# Patient Record
Sex: FEMALE | Race: BLACK OR AFRICAN AMERICAN | Employment: OTHER | URBAN - METROPOLITAN AREA
[De-identification: names, ages, dates, MRNs, and addresses within clinical notes are randomized per-mention and may not be internally consistent; named-entity substitution may affect disease eponyms.]

---

## 2017-07-27 ENCOUNTER — HOSPITAL ENCOUNTER (OUTPATIENT)
Dept: PREADMISSION TESTING | Age: 77
Discharge: HOME | End: 2017-07-27
Payer: MEDICARE

## 2017-07-27 ENCOUNTER — APPOINTMENT (OUTPATIENT)
Dept: PREADMISSION TESTING | Age: 77
End: 2017-07-27
Payer: MEDICARE

## 2017-07-27 VITALS
SYSTOLIC BLOOD PRESSURE: 129 MMHG | HEART RATE: 52 BPM | WEIGHT: 178 LBS | TEMPERATURE: 98.2 F | BODY MASS INDEX: 32.76 KG/M2 | HEIGHT: 62 IN | DIASTOLIC BLOOD PRESSURE: 83 MMHG

## 2017-07-27 LAB
ABO + RH BLD: NORMAL
ANION GAP BLD CALC-SCNC: 8 MMOL/L (ref 5–15)
APPEARANCE UR: CLEAR
BACTERIA URNS QL MICRO: NEGATIVE /HPF
BILIRUB UR QL: NEGATIVE
BLOOD GROUP ANTIBODIES SERPL: NORMAL
BUN SERPL-MCNC: 16 MG/DL (ref 6–20)
BUN/CREAT SERPL: 22 (ref 12–20)
CALCIUM SERPL-MCNC: 9 MG/DL (ref 8.5–10.1)
CHLORIDE SERPL-SCNC: 101 MMOL/L (ref 97–108)
CO2 SERPL-SCNC: 32 MMOL/L (ref 21–32)
COLOR UR: ABNORMAL
CREAT SERPL-MCNC: 0.73 MG/DL (ref 0.55–1.02)
EPITH CASTS URNS QL MICRO: ABNORMAL /LPF
ERYTHROCYTE [DISTWIDTH] IN BLOOD BY AUTOMATED COUNT: 13.8 % (ref 11.5–14.5)
EST. AVERAGE GLUCOSE BLD GHB EST-MCNC: 140 MG/DL
GLUCOSE SERPL-MCNC: 98 MG/DL (ref 65–100)
GLUCOSE UR STRIP.AUTO-MCNC: NEGATIVE MG/DL
HBA1C MFR BLD: 6.5 % (ref 4.2–6.3)
HCT VFR BLD AUTO: 39.3 % (ref 35–47)
HGB BLD-MCNC: 13 G/DL (ref 11.5–16)
HGB UR QL STRIP: NEGATIVE
INR PPP: 1 (ref 0.9–1.1)
KETONES UR QL STRIP.AUTO: NEGATIVE MG/DL
LEUKOCYTE ESTERASE UR QL STRIP.AUTO: ABNORMAL
MCH RBC QN AUTO: 28.6 PG (ref 26–34)
MCHC RBC AUTO-ENTMCNC: 33.1 G/DL (ref 30–36.5)
MCV RBC AUTO: 86.4 FL (ref 80–99)
NITRITE UR QL STRIP.AUTO: NEGATIVE
PH UR STRIP: 5 [PH] (ref 5–8)
PLATELET # BLD AUTO: 210 K/UL (ref 150–400)
POTASSIUM SERPL-SCNC: 3.9 MMOL/L (ref 3.5–5.1)
PROT UR STRIP-MCNC: NEGATIVE MG/DL
PROTHROMBIN TIME: 10.2 SEC (ref 9–11.1)
RBC # BLD AUTO: 4.55 M/UL (ref 3.8–5.2)
RBC #/AREA URNS HPF: ABNORMAL /HPF (ref 0–5)
SODIUM SERPL-SCNC: 141 MMOL/L (ref 136–145)
SP GR UR REFRACTOMETRY: 1.02 (ref 1–1.03)
SPECIMEN EXP DATE BLD: NORMAL
UA: UC IF INDICATED,UAUC: ABNORMAL
UROBILINOGEN UR QL STRIP.AUTO: 0.2 EU/DL (ref 0.2–1)
WBC # BLD AUTO: 4.2 K/UL (ref 3.6–11)
WBC URNS QL MICRO: ABNORMAL /HPF (ref 0–4)

## 2017-07-27 PROCEDURE — 85027 COMPLETE CBC AUTOMATED: CPT | Performed by: ORTHOPAEDIC SURGERY

## 2017-07-27 PROCEDURE — 36415 COLL VENOUS BLD VENIPUNCTURE: CPT | Performed by: ORTHOPAEDIC SURGERY

## 2017-07-27 PROCEDURE — 80048 BASIC METABOLIC PNL TOTAL CA: CPT | Performed by: ORTHOPAEDIC SURGERY

## 2017-07-27 PROCEDURE — 83036 HEMOGLOBIN GLYCOSYLATED A1C: CPT | Performed by: ORTHOPAEDIC SURGERY

## 2017-07-27 PROCEDURE — 86900 BLOOD TYPING SEROLOGIC ABO: CPT | Performed by: ORTHOPAEDIC SURGERY

## 2017-07-27 PROCEDURE — 81001 URINALYSIS AUTO W/SCOPE: CPT | Performed by: ORTHOPAEDIC SURGERY

## 2017-07-27 PROCEDURE — 85610 PROTHROMBIN TIME: CPT | Performed by: ORTHOPAEDIC SURGERY

## 2017-07-27 RX ORDER — ROSUVASTATIN CALCIUM 10 MG/1
10 TABLET, COATED ORAL
COMMUNITY

## 2017-07-27 RX ORDER — BISOPROLOL FUMARATE AND HYDROCHLOROTHIAZIDE 10; 6.25 MG/1; MG/1
1 TABLET ORAL 2 TIMES DAILY
COMMUNITY

## 2017-07-27 RX ORDER — CYANOCOBALAMIN (VITAMIN B-12) 500 MCG
800 TABLET ORAL DAILY
COMMUNITY

## 2017-07-27 RX ORDER — LORATADINE 10 MG/1
10 TABLET ORAL
COMMUNITY

## 2017-07-27 RX ORDER — TRAMADOL HYDROCHLORIDE 50 MG/1
100 TABLET ORAL
COMMUNITY
End: 2017-08-05

## 2017-07-27 RX ORDER — SIMETHICONE 80 MG
80 TABLET,CHEWABLE ORAL
COMMUNITY

## 2017-07-27 RX ORDER — OMEPRAZOLE 20 MG/1
20 CAPSULE, DELAYED RELEASE ORAL DAILY
COMMUNITY

## 2017-07-27 RX ORDER — BISMUTH SUBSALICYLATE 262 MG
1 TABLET,CHEWABLE ORAL DAILY
COMMUNITY

## 2017-07-27 NOTE — PERIOP NOTES
PREOPERATIVE INSTRUCTIONS REVIEWED WITH PATIENT. PATIENT GIVEN TWO--SIX PACKS OF CHG WIPES. INSTRUCTIONS REVIEWED ON USE OF CHG WIPES. PATIENT GIVEN SSI INFECTIONS SHEET; MRSA/MSSA TREATMENT INSTRUCTION SHEET GIVEN WITH AN EXPLANATION TO PATIENT THAT THEY WILL BE NOTIFIED IF TREATMENT INSTRUCTIONS NEED TO BE INITIATED. PATIENT WAS GIVEN THE OPPORTUNITY TO ASK QUESTIONS; REGARDING THE INFORMATION PROVIDED. PREOP DIET AND NUTRITION UPDATED GUIDELINES/ INSTRUCTIONS REVIEWED WITH PATIENT. PATIENT GIVEN INSTRUCTIONS AND DEMONSTRATED UNDERSTANDING OF INCENTIVE SPIROMETRY USE. PATIENT ATTENDED PREOP JOINT CLASS TODAY.

## 2017-07-28 LAB
BACTERIA SPEC CULT: NORMAL
BACTERIA SPEC CULT: NORMAL
SERVICE CMNT-IMP: NORMAL

## 2017-07-28 NOTE — PERIOP NOTES
SPOKE WITH VIANNEY--DR Lewis Messer; FAXED CARDIO: DR MAJANO NOTES DATED 5/8/17. PATIENT STATED THAT SHE HAD PREOP APPT. WITH CARDIO: DR Vikki Agosto ON 7/24/17 BUT THOSE NOTES WERE NOT SENT TO [place] [facility] [place] OFFICE NOTES/EKG FROM MAY 8, 2017 WERE RECEIVED. VIANNEY WILL F/U WITH DR MAJANO ON Monday.

## 2017-08-01 ENCOUNTER — ANESTHESIA EVENT (OUTPATIENT)
Dept: SURGERY | Age: 77
DRG: 470 | End: 2017-08-01
Payer: MEDICARE

## 2017-08-01 PROBLEM — M16.12 PRIMARY OSTEOARTHRITIS OF LEFT HIP: Status: ACTIVE | Noted: 2017-08-01

## 2017-08-01 NOTE — H&P
Melissa Rea comes in complaining of pain in the knees, hips, and lower back. She has previously received epidural injections. Her last one was in January and she experienced relief until the past month when symptoms flared up. She complains of most pain in her right knee and hips, mainly in the left hip. Objective:   Constitutional:  No acute distress. Well nourished. Well developed. Eyes:  Sclera are nonicteric. Respiratory:  No labored breathing. Cardiovascular:  No marked edema. Skin:  No marked skin ulcers. Neurological:  No marked sensory loss noted. Psychiatric: Alert and oriented x3. Musculoskeletal      She has severe pain to rotation of the left hip. Moderate pain to rotation of the right hip. She ambulates with a limp and has pain in the left hip with ambulation that radiates throughout her leg. The cruciate and collateral ligaments of both knees are stable. No sign of infection. No effusion. No cellulitis or rash. No evidence of calf pain, no sign of DVT. The extensor mechanism is intact. The neurovascular status is intact.         Radiographs:       X-ray Hip Left 2+ Views ([id number])     Result Date: 7/11/2017  AP Pelvis, Frog Lateral. Notes Indication(s): Pain of left hip. Impression: I ordered and interpreted X-rays of the left hip and pelvis. The X-rays reveal advanced degenerative arthritis of the left hip and moderate to severe arthritis of the right hip. No fractures, no sign of metastatic disease.      X-ray Knee Left 3 Views ([id number])     Result Date: 7/11/2017  Views: Standing AP, Lat, Ferris. Notes Indication(s): Pain of left knee. Impression: I ordered and interpreted X-rays of the left knee. The X-rays reveal mild degenerative changes. No fractures.      X-ray Knee Right 3 Views ([id number])     Result Date: 7/11/2017  Views: Standing AP, Lat, Ferris. Notes Indication(s): Pain of right knee. Impression: I ordered and interpreted X-rays of the right knee.  The X-rays reveal advanced degenerative arthritis of the right knee, most notable in the patellofemoral and lateral compartments. No fractures, no sign of metastatic disease.          Assessment:      1. Primary osteoarthritis of hips, bilateral    2. Primary osteoarthritis of knees, bilateral    3. Left hip pain    4. Chronic pain of both knees            Plan:   I explained that I believe the majority of her knee pain is a result of referred pain from the hip. I explained that she has very severe arthritis of the left hip with bone-on-bone contact. She is not having as much trouble with the right hip, but I discussed that she does have degenerative changes in the right hip. I explained that she also has some severe degenerative changes in the right knee.     We discussed treatment options. I explained that I believe she needs a left hip replacement. I described the procedure of total hip replacement. I explained the hospitalization, post-op and rehabilitation for the procedure. The patient is aware of all complications associated with the surgery, including the chance of infection and blood clots. She is aware of the slight chance of the hip coming out of the socket. He understands that he would be on anti-coagulants following surgery to prevent DVT and that he would undergo a course of post-op physical therapy for rehabilitation. PROCEDURE: Left total hip replacement. Date of Surgery Update:  Melissa Rea was seen and examined.   Past Medical History:   Diagnosis Date    Arthritis     Asthma     MILD, LAST ATTACK 4 YRS AGO, NO INHALER USED    Cancer (HCC)     BREAST, LEFT    Chronic pain     LEFT HIP, RIGHT KNEE, SHOULDERS, NECK    Diverticulosis     GERD (gastroesophageal reflux disease)     H/O seasonal allergies     Hyperlipemia     Hypertension     Liver disease     FATTY LIVER, NON-ALCOHOLIC    PUD (peptic ulcer disease)     Sleep apnea     NO CPAP USED NOW    Thyroid goiter 2015    RADIATION TREATMENT     Prior to Admission Medications   Prescriptions Last Dose Informant Patient Reported? Taking? FSH/FLX/PRIM/CUR/BOR/OM3,6,9 5 (OMEGA 3-6-9 FATTY ACIDS PO) 7/27/2017  Yes No   Sig: Take  by mouth daily. bisoprolol-hydroCHLOROthiazide [facility]) 10-6.25 mg per tablet 8/2/2017 at 0630  Yes Yes   Sig: Take 1 Tab by mouth two (2) times a day. cholecalciferol (VITAMIN D3) 400 unit tab tablet 7/27/2017  Yes No   Sig: Take 800 Units by mouth daily. loratadine (CLARITIN) 10 mg tablet 8/1/2017 at Unknown time  Yes Yes   Sig: Take 10 mg by mouth daily as needed for Allergies. multivitamin (ONE A DAY) tablet 7/27/2017  Yes No   Sig: Take 1 Tab by mouth daily. omeprazole (PRILOSEC) 20 mg capsule 8/2/2017 at 0630  Yes Yes   Sig: Take 20 mg by mouth daily. rosuvastatin (CRESTOR) 10 mg tablet 8/1/2017 at Unknown time  Yes Yes   Sig: Take 10 mg by mouth nightly. simethicone (MYLICON) 80 mg chewable tablet 8/1/2017 at Unknown time  Yes Yes   Sig: Take 80 mg by mouth two (2) times daily as needed for Flatulence. traMADol (ULTRAM) 50 mg tablet 8/2/2017 at 0630  Yes Yes   Sig: Take 100 mg by mouth every six (6) hours as needed for Pain. Facility-Administered Medications: None      Allergy to:Lipitor [atorvastatin]  Physical Examination: General appearance - alert, well appearing, and in no distress  History and physical has been reviewed. The patient has been examined.  There have been no significant clinical changes since the completion of the originally dated History and Physical.    Signed By: Jules Chowdhury PA-C     August 2, 2017 7:53 AM

## 2017-08-02 ENCOUNTER — APPOINTMENT (OUTPATIENT)
Dept: GENERAL RADIOLOGY | Age: 77
DRG: 470 | End: 2017-08-02
Attending: ORTHOPAEDIC SURGERY
Payer: MEDICARE

## 2017-08-02 ENCOUNTER — HOSPITAL ENCOUNTER (INPATIENT)
Age: 77
LOS: 3 days | Discharge: NURSING, REHAB OR LONG-TERM CARE | DRG: 470 | End: 2017-08-05
Attending: ORTHOPAEDIC SURGERY | Admitting: ORTHOPAEDIC SURGERY
Payer: MEDICARE

## 2017-08-02 ENCOUNTER — APPOINTMENT (OUTPATIENT)
Dept: GENERAL RADIOLOGY | Age: 77
DRG: 470 | End: 2017-08-02
Payer: MEDICARE

## 2017-08-02 ENCOUNTER — ANESTHESIA (OUTPATIENT)
Dept: SURGERY | Age: 77
DRG: 470 | End: 2017-08-02
Payer: MEDICARE

## 2017-08-02 PROBLEM — E66.9 OBESITY (BMI 30.0-34.9): Chronic | Status: ACTIVE | Noted: 2017-07-26

## 2017-08-02 LAB
GLUCOSE BLD STRIP.AUTO-MCNC: 115 MG/DL (ref 65–100)
GLUCOSE BLD STRIP.AUTO-MCNC: 149 MG/DL (ref 65–100)
SERVICE CMNT-IMP: ABNORMAL
SERVICE CMNT-IMP: ABNORMAL

## 2017-08-02 PROCEDURE — 77030034850: Performed by: ORTHOPAEDIC SURGERY

## 2017-08-02 PROCEDURE — 74011000258 HC RX REV CODE- 258

## 2017-08-02 PROCEDURE — 73501 X-RAY EXAM HIP UNI 1 VIEW: CPT

## 2017-08-02 PROCEDURE — 77030020782 HC GWN BAIR PAWS FLX 3M -B

## 2017-08-02 PROCEDURE — 77030008467 HC STPLR SKN COVD -B: Performed by: ORTHOPAEDIC SURGERY

## 2017-08-02 PROCEDURE — 77030011264 HC ELECTRD BLD EXT COVD -A: Performed by: ORTHOPAEDIC SURGERY

## 2017-08-02 PROCEDURE — 3E0T3CZ INTRODUCE REGIONAL ANESTH IN PERIPH NRV, PLEXI, PERC: ICD-10-PCS | Performed by: ANESTHESIOLOGY

## 2017-08-02 PROCEDURE — 77030035236 HC SUT PDS STRATFX BARB J&J -B: Performed by: ORTHOPAEDIC SURGERY

## 2017-08-02 PROCEDURE — 77030020788: Performed by: ORTHOPAEDIC SURGERY

## 2017-08-02 PROCEDURE — 77030011640 HC PAD GRND REM COVD -A: Performed by: ORTHOPAEDIC SURGERY

## 2017-08-02 PROCEDURE — 77030003601 HC NDL NRV BLK BBMI -A

## 2017-08-02 PROCEDURE — 77030012935 HC DRSG AQUACEL BMS -B: Performed by: ORTHOPAEDIC SURGERY

## 2017-08-02 PROCEDURE — 77030006822 HC BLD SAW SAG BRSM -B: Performed by: ORTHOPAEDIC SURGERY

## 2017-08-02 PROCEDURE — C1776 JOINT DEVICE (IMPLANTABLE): HCPCS | Performed by: ORTHOPAEDIC SURGERY

## 2017-08-02 PROCEDURE — 74011000250 HC RX REV CODE- 250

## 2017-08-02 PROCEDURE — 77030013079 HC BLNKT BAIR HGGR 3M -A: Performed by: ANESTHESIOLOGY

## 2017-08-02 PROCEDURE — 65270000029 HC RM PRIVATE

## 2017-08-02 PROCEDURE — 76010000172 HC OR TIME 2.5 TO 3 HR INTENSV-TIER 1: Performed by: ORTHOPAEDIC SURGERY

## 2017-08-02 PROCEDURE — 76060000036 HC ANESTHESIA 2.5 TO 3 HR: Performed by: ORTHOPAEDIC SURGERY

## 2017-08-02 PROCEDURE — 74011250636 HC RX REV CODE- 250/636: Performed by: PHYSICIAN ASSISTANT

## 2017-08-02 PROCEDURE — 77030007866 HC KT SPN ANES BBMI -B: Performed by: ANESTHESIOLOGY

## 2017-08-02 PROCEDURE — 77030018846 HC SOL IRR STRL H20 ICUM -A: Performed by: ORTHOPAEDIC SURGERY

## 2017-08-02 PROCEDURE — 74011250636 HC RX REV CODE- 250/636: Performed by: ANESTHESIOLOGY

## 2017-08-02 PROCEDURE — 76210000006 HC OR PH I REC 0.5 TO 1 HR: Performed by: ORTHOPAEDIC SURGERY

## 2017-08-02 PROCEDURE — 97530 THERAPEUTIC ACTIVITIES: CPT

## 2017-08-02 PROCEDURE — 77030032490 HC SLV COMPR SCD KNE COVD -B

## 2017-08-02 PROCEDURE — 77030018836 HC SOL IRR NACL ICUM -A: Performed by: ORTHOPAEDIC SURGERY

## 2017-08-02 PROCEDURE — 77030012893

## 2017-08-02 PROCEDURE — 74011250636 HC RX REV CODE- 250/636

## 2017-08-02 PROCEDURE — 74011000250 HC RX REV CODE- 250: Performed by: PHYSICIAN ASSISTANT

## 2017-08-02 PROCEDURE — 74011250637 HC RX REV CODE- 250/637: Performed by: PHYSICIAN ASSISTANT

## 2017-08-02 PROCEDURE — 82962 GLUCOSE BLOOD TEST: CPT

## 2017-08-02 PROCEDURE — 74011250637 HC RX REV CODE- 250/637: Performed by: NURSE PRACTITIONER

## 2017-08-02 PROCEDURE — 74011000250 HC RX REV CODE- 250: Performed by: ANESTHESIOLOGY

## 2017-08-02 PROCEDURE — 0SRB04A REPLACEMENT OF LEFT HIP JOINT WITH CERAMIC ON POLYETHYLENE SYNTHETIC SUBSTITUTE, UNCEMENTED, OPEN APPROACH: ICD-10-PCS | Performed by: ORTHOPAEDIC SURGERY

## 2017-08-02 PROCEDURE — 77030031139 HC SUT VCRL2 J&J -A: Performed by: ORTHOPAEDIC SURGERY

## 2017-08-02 PROCEDURE — 97161 PT EVAL LOW COMPLEX 20 MIN: CPT

## 2017-08-02 DEVICE — COMPONENT TOT HIP PRIMARY CERM ALTRX: Type: IMPLANTABLE DEVICE | Site: HIP | Status: FUNCTIONAL

## 2017-08-02 DEVICE — STEM FEM SZ 4 L140MM 130DEG STD OFFSET CALCAR HIP BILAT TI: Type: IMPLANTABLE DEVICE | Site: HIP | Status: FUNCTIONAL

## 2017-08-02 DEVICE — SCR ACET CANC PINN 6.5X15MM SS --: Type: IMPLANTABLE DEVICE | Site: HIP | Status: FUNCTIONAL

## 2017-08-02 DEVICE — CUP ACET DIA52MM HIP GRIPTION PRI CEMENTLESS FIX SECT SER: Type: IMPLANTABLE DEVICE | Site: HIP | Status: FUNCTIONAL

## 2017-08-02 DEVICE — LINER ACET OD52MM ID36MM HIP ALTRX PINN: Type: IMPLANTABLE DEVICE | Site: HIP | Status: FUNCTIONAL

## 2017-08-02 DEVICE — HEAD FEM DIA36MM +5MM OFFSET 12/14 TAPR HIP CERAMIC BIOLOX: Type: IMPLANTABLE DEVICE | Site: HIP | Status: FUNCTIONAL

## 2017-08-02 RX ORDER — DEXAMETHASONE SODIUM PHOSPHATE 10 MG/ML
10 INJECTION INTRAMUSCULAR; INTRAVENOUS ONCE
Status: COMPLETED | OUTPATIENT
Start: 2017-08-03 | End: 2017-08-03

## 2017-08-02 RX ORDER — SODIUM CHLORIDE, SODIUM LACTATE, POTASSIUM CHLORIDE, CALCIUM CHLORIDE 600; 310; 30; 20 MG/100ML; MG/100ML; MG/100ML; MG/100ML
125 INJECTION, SOLUTION INTRAVENOUS CONTINUOUS
Status: DISCONTINUED | OUTPATIENT
Start: 2017-08-02 | End: 2017-08-02 | Stop reason: HOSPADM

## 2017-08-02 RX ORDER — MIDAZOLAM HYDROCHLORIDE 1 MG/ML
INJECTION, SOLUTION INTRAMUSCULAR; INTRAVENOUS AS NEEDED
Status: DISCONTINUED | OUTPATIENT
Start: 2017-08-02 | End: 2017-08-02 | Stop reason: HOSPADM

## 2017-08-02 RX ORDER — CEFAZOLIN SODIUM IN 0.9 % NACL 2 G/50 ML
2 INTRAVENOUS SOLUTION, PIGGYBACK (ML) INTRAVENOUS EVERY 8 HOURS
Status: COMPLETED | OUTPATIENT
Start: 2017-08-02 | End: 2017-08-03

## 2017-08-02 RX ORDER — ROSUVASTATIN CALCIUM 10 MG/1
10 TABLET, COATED ORAL
Status: DISCONTINUED | OUTPATIENT
Start: 2017-08-03 | End: 2017-08-05 | Stop reason: HOSPADM

## 2017-08-02 RX ORDER — CELECOXIB 200 MG/1
200 CAPSULE ORAL 2 TIMES DAILY
Status: DISCONTINUED | OUTPATIENT
Start: 2017-08-02 | End: 2017-08-05 | Stop reason: HOSPADM

## 2017-08-02 RX ORDER — PROPOFOL 10 MG/ML
INJECTION, EMULSION INTRAVENOUS AS NEEDED
Status: DISCONTINUED | OUTPATIENT
Start: 2017-08-02 | End: 2017-08-02 | Stop reason: HOSPADM

## 2017-08-02 RX ORDER — SODIUM CHLORIDE 0.9 % (FLUSH) 0.9 %
5-10 SYRINGE (ML) INJECTION AS NEEDED
Status: DISCONTINUED | OUTPATIENT
Start: 2017-08-02 | End: 2017-08-02 | Stop reason: HOSPADM

## 2017-08-02 RX ORDER — NALOXONE HYDROCHLORIDE 0.4 MG/ML
0.4 INJECTION, SOLUTION INTRAMUSCULAR; INTRAVENOUS; SUBCUTANEOUS AS NEEDED
Status: DISCONTINUED | OUTPATIENT
Start: 2017-08-02 | End: 2017-08-05 | Stop reason: HOSPADM

## 2017-08-02 RX ORDER — MIDAZOLAM HYDROCHLORIDE 1 MG/ML
1 INJECTION, SOLUTION INTRAMUSCULAR; INTRAVENOUS AS NEEDED
Status: DISCONTINUED | OUTPATIENT
Start: 2017-08-02 | End: 2017-08-02 | Stop reason: HOSPADM

## 2017-08-02 RX ORDER — BISOPROLOL FUMARATE AND HYDROCHLOROTHIAZIDE 10; 6.25 MG/1; MG/1
1 TABLET ORAL 2 TIMES DAILY
Status: DISCONTINUED | OUTPATIENT
Start: 2017-08-03 | End: 2017-08-05 | Stop reason: HOSPADM

## 2017-08-02 RX ORDER — ACETAMINOPHEN 325 MG/1
650 TABLET ORAL EVERY 6 HOURS
Status: DISCONTINUED | OUTPATIENT
Start: 2017-08-02 | End: 2017-08-05 | Stop reason: HOSPADM

## 2017-08-02 RX ORDER — DEXAMETHASONE SODIUM PHOSPHATE 4 MG/ML
INJECTION, SOLUTION INTRA-ARTICULAR; INTRALESIONAL; INTRAMUSCULAR; INTRAVENOUS; SOFT TISSUE AS NEEDED
Status: DISCONTINUED | OUTPATIENT
Start: 2017-08-02 | End: 2017-08-02 | Stop reason: HOSPADM

## 2017-08-02 RX ORDER — ROSUVASTATIN CALCIUM 10 MG/1
10 TABLET, COATED ORAL
Status: DISCONTINUED | OUTPATIENT
Start: 2017-08-02 | End: 2017-08-02

## 2017-08-02 RX ORDER — SODIUM CHLORIDE 9 MG/ML
125 INJECTION, SOLUTION INTRAVENOUS CONTINUOUS
Status: DISCONTINUED | OUTPATIENT
Start: 2017-08-02 | End: 2017-08-03

## 2017-08-02 RX ORDER — MIDAZOLAM HYDROCHLORIDE 1 MG/ML
1 INJECTION, SOLUTION INTRAMUSCULAR; INTRAVENOUS
Status: DISCONTINUED | OUTPATIENT
Start: 2017-08-02 | End: 2017-08-02 | Stop reason: HOSPADM

## 2017-08-02 RX ORDER — HYDROMORPHONE HYDROCHLORIDE 1 MG/ML
0.5 INJECTION, SOLUTION INTRAMUSCULAR; INTRAVENOUS; SUBCUTANEOUS
Status: ACTIVE | OUTPATIENT
Start: 2017-08-02 | End: 2017-08-03

## 2017-08-02 RX ORDER — POLYVINYL ALCOHOL 14 MG/ML
1 SOLUTION/ DROPS OPHTHALMIC AS NEEDED
Status: DISCONTINUED | OUTPATIENT
Start: 2017-08-02 | End: 2017-08-05 | Stop reason: HOSPADM

## 2017-08-02 RX ORDER — PANTOPRAZOLE SODIUM 40 MG/1
40 TABLET, DELAYED RELEASE ORAL
Status: DISCONTINUED | OUTPATIENT
Start: 2017-08-03 | End: 2017-08-05 | Stop reason: HOSPADM

## 2017-08-02 RX ORDER — ONDANSETRON 2 MG/ML
INJECTION INTRAMUSCULAR; INTRAVENOUS AS NEEDED
Status: DISCONTINUED | OUTPATIENT
Start: 2017-08-02 | End: 2017-08-02 | Stop reason: HOSPADM

## 2017-08-02 RX ORDER — ONDANSETRON 2 MG/ML
4 INJECTION INTRAMUSCULAR; INTRAVENOUS AS NEEDED
Status: DISCONTINUED | OUTPATIENT
Start: 2017-08-02 | End: 2017-08-02 | Stop reason: HOSPADM

## 2017-08-02 RX ORDER — OXYCODONE HYDROCHLORIDE 5 MG/1
5 TABLET ORAL AS NEEDED
Status: DISCONTINUED | OUTPATIENT
Start: 2017-08-02 | End: 2017-08-02 | Stop reason: HOSPADM

## 2017-08-02 RX ORDER — LORATADINE 10 MG/1
10 TABLET ORAL
Status: DISCONTINUED | OUTPATIENT
Start: 2017-08-03 | End: 2017-08-05 | Stop reason: HOSPADM

## 2017-08-02 RX ORDER — FAMOTIDINE 20 MG/1
20 TABLET, FILM COATED ORAL
Status: DISCONTINUED | OUTPATIENT
Start: 2017-08-02 | End: 2017-08-05 | Stop reason: HOSPADM

## 2017-08-02 RX ORDER — SODIUM CHLORIDE 0.9 % (FLUSH) 0.9 %
5-10 SYRINGE (ML) INJECTION AS NEEDED
Status: DISCONTINUED | OUTPATIENT
Start: 2017-08-02 | End: 2017-08-05 | Stop reason: HOSPADM

## 2017-08-02 RX ORDER — AMOXICILLIN 250 MG
1 CAPSULE ORAL 2 TIMES DAILY
Status: DISCONTINUED | OUTPATIENT
Start: 2017-08-02 | End: 2017-08-05 | Stop reason: HOSPADM

## 2017-08-02 RX ORDER — FENTANYL CITRATE 50 UG/ML
50 INJECTION, SOLUTION INTRAMUSCULAR; INTRAVENOUS AS NEEDED
Status: DISCONTINUED | OUTPATIENT
Start: 2017-08-02 | End: 2017-08-02 | Stop reason: HOSPADM

## 2017-08-02 RX ORDER — OXYCODONE HYDROCHLORIDE 5 MG/1
5-7.5 TABLET ORAL
Status: DISCONTINUED | OUTPATIENT
Start: 2017-08-02 | End: 2017-08-04

## 2017-08-02 RX ORDER — PHENYLEPHRINE HCL IN 0.9% NACL 0.4MG/10ML
SYRINGE (ML) INTRAVENOUS
Status: DISCONTINUED | OUTPATIENT
Start: 2017-08-02 | End: 2017-08-02 | Stop reason: HOSPADM

## 2017-08-02 RX ORDER — OXYCODONE HYDROCHLORIDE 5 MG/1
10 TABLET ORAL
Status: DISCONTINUED | OUTPATIENT
Start: 2017-08-02 | End: 2017-08-02

## 2017-08-02 RX ORDER — ONDANSETRON 2 MG/ML
4 INJECTION INTRAMUSCULAR; INTRAVENOUS
Status: ACTIVE | OUTPATIENT
Start: 2017-08-02 | End: 2017-08-03

## 2017-08-02 RX ORDER — SIMETHICONE 80 MG
80 TABLET,CHEWABLE ORAL
Status: DISCONTINUED | OUTPATIENT
Start: 2017-08-02 | End: 2017-08-05 | Stop reason: HOSPADM

## 2017-08-02 RX ORDER — FACIAL-BODY WIPES
10 EACH TOPICAL DAILY PRN
Status: DISCONTINUED | OUTPATIENT
Start: 2017-08-04 | End: 2017-08-05 | Stop reason: HOSPADM

## 2017-08-02 RX ORDER — ACETAMINOPHEN 325 MG/1
650 TABLET ORAL
Status: DISCONTINUED | OUTPATIENT
Start: 2017-08-02 | End: 2017-08-05 | Stop reason: HOSPADM

## 2017-08-02 RX ORDER — BUPIVACAINE HYDROCHLORIDE 5 MG/ML
INJECTION, SOLUTION EPIDURAL; INTRACAUDAL AS NEEDED
Status: DISCONTINUED | OUTPATIENT
Start: 2017-08-02 | End: 2017-08-02 | Stop reason: HOSPADM

## 2017-08-02 RX ORDER — OXYCODONE HYDROCHLORIDE 5 MG/1
2.5 TABLET ORAL
Status: DISCONTINUED | OUTPATIENT
Start: 2017-08-02 | End: 2017-08-05 | Stop reason: HOSPADM

## 2017-08-02 RX ORDER — LIDOCAINE HYDROCHLORIDE 10 MG/ML
0.5 INJECTION, SOLUTION EPIDURAL; INFILTRATION; INTRACAUDAL; PERINEURAL AS NEEDED
Status: DISCONTINUED | OUTPATIENT
Start: 2017-08-02 | End: 2017-08-02 | Stop reason: HOSPADM

## 2017-08-02 RX ORDER — HYDROXYZINE HYDROCHLORIDE 10 MG/1
10 TABLET, FILM COATED ORAL
Status: DISCONTINUED | OUTPATIENT
Start: 2017-08-02 | End: 2017-08-05 | Stop reason: HOSPADM

## 2017-08-02 RX ORDER — DIPHENHYDRAMINE HYDROCHLORIDE 50 MG/ML
12.5 INJECTION, SOLUTION INTRAMUSCULAR; INTRAVENOUS AS NEEDED
Status: DISCONTINUED | OUTPATIENT
Start: 2017-08-02 | End: 2017-08-02 | Stop reason: HOSPADM

## 2017-08-02 RX ORDER — GLYCOPYRROLATE 0.2 MG/ML
INJECTION INTRAMUSCULAR; INTRAVENOUS AS NEEDED
Status: DISCONTINUED | OUTPATIENT
Start: 2017-08-02 | End: 2017-08-02 | Stop reason: HOSPADM

## 2017-08-02 RX ORDER — CEFAZOLIN SODIUM IN 0.9 % NACL 2 G/50 ML
2 INTRAVENOUS SOLUTION, PIGGYBACK (ML) INTRAVENOUS EVERY 8 HOURS
Status: DISCONTINUED | OUTPATIENT
Start: 2017-08-02 | End: 2017-08-02 | Stop reason: HOSPADM

## 2017-08-02 RX ORDER — MORPHINE SULFATE 10 MG/ML
2 INJECTION, SOLUTION INTRAMUSCULAR; INTRAVENOUS
Status: DISCONTINUED | OUTPATIENT
Start: 2017-08-02 | End: 2017-08-02 | Stop reason: HOSPADM

## 2017-08-02 RX ORDER — SODIUM CHLORIDE 0.9 % (FLUSH) 0.9 %
5-10 SYRINGE (ML) INJECTION EVERY 8 HOURS
Status: DISCONTINUED | OUTPATIENT
Start: 2017-08-03 | End: 2017-08-05 | Stop reason: HOSPADM

## 2017-08-02 RX ORDER — FENTANYL CITRATE 50 UG/ML
25 INJECTION, SOLUTION INTRAMUSCULAR; INTRAVENOUS
Status: DISCONTINUED | OUTPATIENT
Start: 2017-08-02 | End: 2017-08-02 | Stop reason: HOSPADM

## 2017-08-02 RX ORDER — HYDROMORPHONE HYDROCHLORIDE 1 MG/ML
0.2 INJECTION, SOLUTION INTRAMUSCULAR; INTRAVENOUS; SUBCUTANEOUS
Status: DISCONTINUED | OUTPATIENT
Start: 2017-08-02 | End: 2017-08-02 | Stop reason: HOSPADM

## 2017-08-02 RX ORDER — DEXTROSE, SODIUM CHLORIDE, SODIUM LACTATE, POTASSIUM CHLORIDE, AND CALCIUM CHLORIDE 5; .6; .31; .03; .02 G/100ML; G/100ML; G/100ML; G/100ML; G/100ML
125 INJECTION, SOLUTION INTRAVENOUS CONTINUOUS
Status: DISCONTINUED | OUTPATIENT
Start: 2017-08-02 | End: 2017-08-02 | Stop reason: HOSPADM

## 2017-08-02 RX ORDER — PROPOFOL 10 MG/ML
INJECTION, EMULSION INTRAVENOUS
Status: DISCONTINUED | OUTPATIENT
Start: 2017-08-02 | End: 2017-08-02 | Stop reason: HOSPADM

## 2017-08-02 RX ORDER — CELECOXIB 200 MG/1
200 CAPSULE ORAL ONCE
Status: COMPLETED | OUTPATIENT
Start: 2017-08-02 | End: 2017-08-02

## 2017-08-02 RX ORDER — SODIUM CHLORIDE 0.9 % (FLUSH) 0.9 %
5-10 SYRINGE (ML) INJECTION EVERY 8 HOURS
Status: DISCONTINUED | OUTPATIENT
Start: 2017-08-02 | End: 2017-08-02 | Stop reason: HOSPADM

## 2017-08-02 RX ORDER — CEFAZOLIN SODIUM IN 0.9 % NACL 2 G/100 ML
PLASTIC BAG, INJECTION (ML) INTRAVENOUS AS NEEDED
Status: DISCONTINUED | OUTPATIENT
Start: 2017-08-02 | End: 2017-08-02 | Stop reason: HOSPADM

## 2017-08-02 RX ORDER — OXYCODONE HYDROCHLORIDE 5 MG/1
5 TABLET ORAL
Status: DISCONTINUED | OUTPATIENT
Start: 2017-08-02 | End: 2017-08-02

## 2017-08-02 RX ORDER — POLYETHYLENE GLYCOL 3350 17 G/17G
17 POWDER, FOR SOLUTION ORAL DAILY
Status: DISCONTINUED | OUTPATIENT
Start: 2017-08-03 | End: 2017-08-05 | Stop reason: HOSPADM

## 2017-08-02 RX ORDER — DEXAMETHASONE SODIUM PHOSPHATE 10 MG/ML
10 INJECTION INTRAMUSCULAR; INTRAVENOUS ONCE
Status: DISCONTINUED | OUTPATIENT
Start: 2017-08-02 | End: 2017-08-02 | Stop reason: HOSPADM

## 2017-08-02 RX ADMIN — PROPOFOL 20 MG: 10 INJECTION, EMULSION INTRAVENOUS at 09:34

## 2017-08-02 RX ADMIN — RIVAROXABAN 10 MG: 10 TABLET, FILM COATED ORAL at 18:19

## 2017-08-02 RX ADMIN — SODIUM CHLORIDE, SODIUM LACTATE, POTASSIUM CHLORIDE, AND CALCIUM CHLORIDE: 600; 310; 30; 20 INJECTION, SOLUTION INTRAVENOUS at 10:37

## 2017-08-02 RX ADMIN — PROPOFOL 30 MG: 10 INJECTION, EMULSION INTRAVENOUS at 09:28

## 2017-08-02 RX ADMIN — MIDAZOLAM HYDROCHLORIDE 2 MG: 1 INJECTION, SOLUTION INTRAMUSCULAR; INTRAVENOUS at 09:20

## 2017-08-02 RX ADMIN — DEXAMETHASONE SODIUM PHOSPHATE 10 MG: 4 INJECTION, SOLUTION INTRA-ARTICULAR; INTRALESIONAL; INTRAMUSCULAR; INTRAVENOUS; SOFT TISSUE at 09:42

## 2017-08-02 RX ADMIN — GLYCOPYRROLATE 0.2 MG: 0.2 INJECTION INTRAMUSCULAR; INTRAVENOUS at 09:54

## 2017-08-02 RX ADMIN — SODIUM CHLORIDE 125 ML/HR: 900 INJECTION, SOLUTION INTRAVENOUS at 12:56

## 2017-08-02 RX ADMIN — PROPOFOL 75 MCG/KG/MIN: 10 INJECTION, EMULSION INTRAVENOUS at 09:35

## 2017-08-02 RX ADMIN — CEFAZOLIN 2 G: 1 INJECTION, POWDER, FOR SOLUTION INTRAMUSCULAR; INTRAVENOUS; PARENTERAL at 18:16

## 2017-08-02 RX ADMIN — Medication 40 MCG/MIN: at 09:44

## 2017-08-02 RX ADMIN — OXYCODONE HYDROCHLORIDE 5 MG: 5 TABLET ORAL at 23:36

## 2017-08-02 RX ADMIN — SODIUM CHLORIDE, SODIUM LACTATE, POTASSIUM CHLORIDE, AND CALCIUM CHLORIDE 125 ML/HR: 600; 310; 30; 20 INJECTION, SOLUTION INTRAVENOUS at 08:18

## 2017-08-02 RX ADMIN — LIDOCAINE HYDROCHLORIDE 0.5 ML: 10 INJECTION, SOLUTION EPIDURAL; INFILTRATION; INTRACAUDAL; PERINEURAL at 08:18

## 2017-08-02 RX ADMIN — CELECOXIB 200 MG: 200 CAPSULE ORAL at 08:22

## 2017-08-02 RX ADMIN — OXYCODONE HYDROCHLORIDE 2.5 MG: 5 TABLET ORAL at 17:23

## 2017-08-02 RX ADMIN — SODIUM CHLORIDE 125 ML/HR: 900 INJECTION, SOLUTION INTRAVENOUS at 20:18

## 2017-08-02 RX ADMIN — BUPIVACAINE HYDROCHLORIDE 12 MG: 5 INJECTION, SOLUTION EPIDURAL; INTRACAUDAL at 09:39

## 2017-08-02 RX ADMIN — PROPOFOL 30 MG: 10 INJECTION, EMULSION INTRAVENOUS at 09:31

## 2017-08-02 RX ADMIN — ONDANSETRON 4 MG: 2 INJECTION INTRAMUSCULAR; INTRAVENOUS at 09:42

## 2017-08-02 RX ADMIN — CELECOXIB 200 MG: 200 CAPSULE ORAL at 18:19

## 2017-08-02 RX ADMIN — ACETAMINOPHEN 650 MG: 325 TABLET, FILM COATED ORAL at 19:35

## 2017-08-02 RX ADMIN — OXYCODONE HYDROCHLORIDE 5 MG: 5 TABLET ORAL at 20:21

## 2017-08-02 RX ADMIN — DOCUSATE SODIUM AND SENNOSIDES 1 TABLET: 8.6; 5 TABLET, FILM COATED ORAL at 18:19

## 2017-08-02 RX ADMIN — Medication 2 G: at 09:42

## 2017-08-02 RX ADMIN — ACETAMINOPHEN 650 MG: 325 TABLET, FILM COATED ORAL at 14:32

## 2017-08-02 NOTE — ANESTHESIA POSTPROCEDURE EVALUATION
Post-Anesthesia Evaluation and Assessment    Patient: Elana Hayes MRN: [id number]  SSN: [id number]    YOB: 1940  Age: 77 y.o. Sex: female       Cardiovascular Function/Vital Signs  Visit Vitals    /67    Pulse 64    Temp 36.4 °C (97.6 °F)    Resp 13    Ht 5' 2\" (1.575 m)    Wt 80.7 kg (178 lb)    SpO2 100%    BMI 32.56 kg/m2       Patient is status post spinal anesthesia for Procedure(s):  HIP ARTHROPLASTY LEFT TOTAL. Nausea/Vomiting: None    Postoperative hydration reviewed and adequate. Pain:  Pain Scale 1: Numeric (0 - 10) (08/02/17 1203)  Pain Intensity 1: 0 (08/02/17 1203)   Managed    Neurological Status:   Neuro (WDL): Exceptions to WDL (slightly drowsy; numb from spinal) (08/02/17 1203)  Neuro  LUE Motor Response: Purposeful (08/02/17 1203)  LLE Motor Response: Numbness; Pharmacologically paralyzed (dermatones at L3) (08/02/17 1203)  RUE Motor Response: Purposeful (08/02/17 1203)  RLE Motor Response: Numbness; Pharmacologically paralyzed (dermatones at L3) (08/02/17 1203)   At baseline    Mental Status and Level of Consciousness: Arousable    Pulmonary Status:   O2 Device: Nasal cannula (08/02/17 1203)   Adequate oxygenation and airway patent    Complications related to anesthesia: None    Post-anesthesia assessment completed.  No concerns    Signed By: Brian Arevalo MD     August 2, 2017

## 2017-08-02 NOTE — PROGRESS NOTES
Primary Nurse Mary Beckwith RN performed a dual skin assessment on this patient No impairment noted  Young score is 21

## 2017-08-02 NOTE — ANESTHESIA PREPROCEDURE EVALUATION
Anesthetic History   No history of anesthetic complications            Review of Systems / Medical History  Patient summary reviewed, nursing notes reviewed and pertinent labs reviewed    Pulmonary  Within defined limits      Sleep apnea    Asthma        Neuro/Psych   Within defined limits           Cardiovascular  Within defined limits  Hypertension                   GI/Hepatic/Renal  Within defined limits   GERD      PUD and liver disease     Endo/Other  Within defined limits    Hypothyroidism  Arthritis     Other Findings              Physical Exam    Airway  Mallampati: II  TM Distance: > 6 cm  Neck ROM: normal range of motion   Mouth opening: Normal     Cardiovascular  Regular rate and rhythm,  S1 and S2 normal,  no murmur, click, rub, or gallop             Dental  No notable dental hx       Pulmonary  Breath sounds clear to auscultation               Abdominal  GI exam deferred       Other Findings            Anesthetic Plan    ASA: 3  Anesthesia type: general          Induction: Intravenous  Anesthetic plan and risks discussed with: Patient

## 2017-08-02 NOTE — PROGRESS NOTES
TRANSFER - IN REPORT:    Verbal report received from Erika(name) on Melissa Rea  being received from PACU(unit) for routine post - op      Report consisted of patients Situation, Background, Assessment and   Recommendations(SBAR). Information from the following report(s) SBAR, Kardex, Intake/Output, MAR and Recent Results was reviewed with the receiving nurse. Opportunity for questions and clarification was provided. Assessment completed upon patients arrival to unit and care assumed.

## 2017-08-02 NOTE — PROGRESS NOTES
Occupational Therapy Note:     Pt is POD 0 from L THR. Per protocol, will follow tomorrow for evaluation.        Dave Phillips, OT

## 2017-08-02 NOTE — PROGRESS NOTES
Bedside and Verbal shift change report given to Hailee Sanchez (oncoming nurse) by Marky Maya (offgoing nurse). Report included the following information SBAR, Kardex, Intake/Output, MAR and Recent Results.

## 2017-08-02 NOTE — BRIEF OP NOTE
BRIEF OPERATIVE NOTE    Date of Procedure: 8/2/2017   Preoperative Diagnosis: DJD LEFT HIP   Postoperative Diagnosis: DJD LEFT HIP     Procedure(s):  HIP ARTHROPLASTY LEFT TOTAL  Surgeon(s) and Role:     * Vivian Powell MD - Primary         Assistant Staff:  Physician Assistant: Lorena Weeks PA-C    Surgical Staff:  Circ-1: Parish Nieves RN  Circ-Relief: Aurora Molina RN; Adeline Bae RN  Physician Assistant: Lorena Weeks PA-C  Scrub Tech-1: Stephy Franco  Surg Asst-1: Jenny Koehler Duff-Friol  Float Staff: Wilberto Sanchez  Event Time In   Incision Start 1005   Incision Close      Anesthesia: Spinal   Estimated Blood Loss: 200 mL  Specimens: * No specimens in log *   Findings: DJD Left hip   Complications: None. Implants:   Implant Name Type Inv.  Item Serial No.  Lot No. LRB No. Used Action   CUP ACET SECTOR GRIPTION 52MM -- TI - SNA  CUP ACET SECTOR GRIPTION 52MM -- TI NA [facility] [id number] Left 1 Implanted   SCR ACET CANC PINN 6.5X15MM SS --  - SNA  SCR ACET CANC PINN 6.5X15MM SS --  NA [facility] [id number] Left 1 Implanted   LINER ACET PINN NEUT [id number] -- ALTRX - SNA  LINER ACET PINN NEUT [id number] -- ALTRX NA [facility] [id number] Left 1 Implanted   STEM FEM 12/14 TAPR 4 -- SUMMIT - SNA  STEM FEM 12/14 TAPR 4 -- SUMMIT NA [facility] [id number] Left 1 Implanted   HEAD FEM S-ROM 36MM +5MM NK -- BIOLOX DELTA - SNA   HEAD FEM S-ROM 36MM +5MM NK -- BIOLOX DELTA NA [facility] [id number] Left 1 Implanted

## 2017-08-02 NOTE — IP AVS SNAPSHOT
[address] 
[phone] Patient: Jackie Rea MRN: [id number] CIZ:[birth year] You are allergic to the following Allergen Reactions Lipitor (Atorvastatin) Hives Itching Recent Documentation Height Weight BMI OB Status Smoking Status 1.575 m 80.7 kg 32.56 kg/m2 Postmenopausal Never Smoker Emergency Contacts Name Discharge Info Relation Home Work Mobile Parul Rea DISCHARGE CAREGIVER [3] Child [2] [phone] [place] DISCHAR[place] CAREG[place] [3] Child [2]   [phone] About your hospitalization You were admitted on:  August 2, 2017 You last received care in the:  [id number] [place] [address] You were discharged on:  August 5, 2017 Unit phone number:  [phone] Why you were hospitalized Your primary diagnosis was:  Primary Osteoarthritis Of Left Hip Your diagnoses also included:  Obesity (Bmi 30.0-34. 9) Providers Seen During Your Hospitalizations Provider Role Specialty Primary office phone Bebo Alba MD Attending Provider Orthopedic Surgery [phone] Your Primary Care Physician (PCP) Primary Care Physician Office Phone Office Fax Clement Vipin [phone] Follow-up Information Follow up With Details Comments Contact Info Ron Crowder MD   [address] [address] 
[phone] Current Discharge Medication List  
  
START taking these medications Dose & Instructions Dispensing Information Comments Morning Noon Evening Bedtime  
 celecoxib 200 mg capsule Commonly known as:  CeleBREX Your last dose was: Your next dose is:    
   
   
 Dose:  200 mg Take 1 Cap by mouth daily for 30 days. Quantity:  30 Cap Refills:  0  
     
   
   
   
  
 oxyCODONE IR 5 mg immediate release tablet Commonly known as:  Grayson Jacques Your last dose was: Your next dose is:    
   
   
 Dose:  5 mg Take 1 Tab by mouth every four (4) hours as needed for Pain. Max Daily Amount: 30 mg.  
 Quantity:  60 Tab Refills:  0  
     
   
   
   
  
 rivaroxaban 10 mg tablet Commonly known as:  Benji Erickson Your last dose was: Your next dose is:    
   
   
 Dose:  10 mg Take 1 Tab by mouth daily (with dinner) for 35 days. Indications: HIP SURGERY DEEP VEIN THROMBOSIS PREVENTION Quantity:  35 Tab Refills:  0 CONTINUE these medications which have NOT CHANGED Dose & Instructions Dispensing Information Comments Morning Noon Evening Bedtime  
 bisoprolol-hydroCHLOROthiazide 10-6.25 mg per tablet Commonly known as:  [facility] Your last dose was: Your next dose is:    
   
   
 Dose:  1 Tab Take 1 Tab by mouth two (2) times a day. Refills:  0  
     
   
   
   
  
 cholecalciferol 400 unit Tab tablet Commonly known as:  VITAMIN D3 Your last dose was: Your next dose is:    
   
   
 Dose:  800 Units Take 800 Units by mouth daily. Refills:  0 CLARITIN 10 mg tablet Generic drug:  loratadine Your last dose was: Your next dose is:    
   
   
 Dose:  10 mg Take 10 mg by mouth daily as needed for Allergies. Refills:  0  
     
   
   
   
  
 CRESTOR 10 mg tablet Generic drug:  rosuvastatin Your last dose was: Your next dose is:    
   
   
 Dose:  10 mg Take 10 mg by mouth nightly. Refills:  0  
     
   
   
   
  
 multivitamin tablet Commonly known as:  ONE A DAY Your last dose was: Your next dose is:    
   
   
 Dose:  1 Tab Take 1 Tab by mouth daily. Refills:  0 OMEGA 3-6-9 FATTY ACIDS PO Your last dose was: Your next dose is: Take  by mouth daily. Refills:  0 omeprazole 20 mg capsule Commonly known as:  PRILOSEC Your last dose was: Your next dose is:    
   
   
 Dose:  20 mg Take 20 mg by mouth daily. Refills:  0  
     
   
   
   
  
 simethicone 80 mg chewable tablet Commonly known as:  Tally Oconto Your last dose was: Your next dose is:    
   
   
 Dose:  80 mg Take 80 mg by mouth two (2) times daily as needed for Flatulence. Refills:  0 STOP taking these medications   
 traMADol 50 mg tablet Commonly known as:  ULTRAM  
   
  
  
  
Where to Get Your Medications Information on where to get these meds will be given to you by the nurse or doctor. ! Ask your nurse or doctor about these medications  
  celecoxib 200 mg capsule  
 oxyCODONE IR 5 mg immediate release tablet  
 rivaroxaban 10 mg tablet Discharge Instructions Patient meets criteria for BUNDLED PAYMENT  
for Care Improvement Initiative Criteria Contact Information for Orthopedic Nurse Navigator:     
RONEL Reddy, RN-BC 
U:[phone] E:[phone] C:[phone] DC Orders All Total Hips Case Management for DC planning to __________ Rehab . - PT 2 times a week for 2 weeks; 50% WBAT with AVOID sudden and extreme movement of your hip (surgical leg). - Xarelto daily therapy x 35 days. 
 - Remove staples at 2 weeks post-op; 8/16/17. 
 - Follow up in Office in 2 1/2 weeks. After Hospital Care Plan:  Discharge Instructions Hip Replacement-Dr. Dorie Tavera Patient Name: Kaleb Rea Date of procedure: 8/2/2017 Procedure: Procedure(s): HIP ARTHROPLASTY LEFT TOTAL Surgeon: Susan Thrasher) and Role: 
   * Margie Mann MD - Primary PCP: Julia Justice MD 
Date of discharge: No discharge date for patient encounter. Follow up appointments ? Follow up with Dr. Dorie Tavera in 2 ½ weeks. Call [phone] to make an appointment. ? If home health has been arranged for you the agency will contact you to arrange dates/times for visits. Please call them if you do not hear from them within 24 hours after you are discharged When to call your Orthopaedic Surgeon: Call [phone]. If you call after 5pm or on a weekend, the on call physician will be contacted ? Increased hip pain, unrelieved pain or if you have difficulty or are unable to walk ? Signs of infection-if your incision is red; continues to have drainage; drainage has a foul odor or if you have a persistent fever over 101 degrees ? Signs of a blood clot in your leg-calf pain, tenderness, redness, swelling of lower leg When to call your Primary Care Physician: 
? Concerns about medical conditions such as diabetes, high blood pressure, asthma, congestive heart failure ? Call if blood sugars are elevated, persistent headache or dizziness, coughing or congestion, constipation or diarrhea, burning with urination, abnormal heart rate When to call [id number] go to the nearest emergency room 
? acute onset of chest pain, shortness of breath, difficulty breathing Activity ? 50% weight bearing with walker or crutches for 2 weeks, then as tolerated. Refer to pages 23-33 of your handbook for instructions and pictures ? Complete you Home Exercise Program daily as instructed by your therapist. Refer to pages 36-42 of your handbook for instructions and pictures ? Get up every one hour and walk (except at night when sleeping) ? AVOID sudden and extreme movement of your hip (surgical leg) ? Do not drive or operate heavy machinery Incision Care ? The Aquacel (brown, waterproof) surgical dressing is to remain on your hip for 7 days. On the 7th day have someone gently peel the dressing off by carefully lifting the edge and stretching it slightly to break the adhesive seal 
? You will have staples in your hip incision. They will be removed by the home health agency staff ? If your Aquacel dressing comes loose/off before the 7th day, you may replace it with a dry sterile gauze dressing; change it daily. Once your incision is not draining, you may leave it open to air ? You may take a shower with the Aquacel dressing in place. Once the Aquacel is removed, you may shower and get your incision wet but do not submerge your incision under water in a bath tub, hot tub or swimming pool for 6 weeks after surgery. Preventing blood clots ? Take Xarelto as prescribed by Dr. Kassandra Baker for one month following surgery ? Wear elastic stockings (TEDS) for 4 weeks. You should remove them for approximately 1 hour daily for showering/sponge bathing Pain management ? Take pain medication as prescribed; decrease the amount you use as your pain lessens ? Avoid alcoholic beverages while taking pain medication ? Please be aware that many medications contain Tylenol. We do not want you to over medicate so please read the information below as a guide. Do not take more than 3 Grams of Tylenol in a 24 hour period. (There are 1000 milligrams in one Gram) 
o 325 mg of Tylenol per tablet (do not take more than 9 tablets in 24 hours) 
o 500 mg of Tylenol per tablet (do not take more than 6 tablets in 24 hours) ? You may place an ice bag on your hip for 15-20 minutes after exercising and as needed throughout the day and night Diet ? Resume usual diet; drink plenty of fluids; eat foods high in fiber ? You may want to take a stool softener (such as Senokot-S or Colace) to prevent constipation while you are taking pain medication. If constipation occurs, take a laxative (such as Dulcolax tablets, Milk of Magnesia, or a suppository) Home Health Care Protocol (to be followed by Lynn Roman) Nursing-see physicians order ? Remove staples per physicians order ? Complete head to toe assessment, vital signs ? Medication reconciliation ? Review pain management ? Manage chronic medical conditions Physical Therapy-see physician's order Weight bearing status: 
Precautions at Admission: PWB, Fall (50%) Left Side Weight Bearing: Partial (%) (50%) ? Mobility Status: 
Supine to Sit: Minimum assistance, Additional time Sit to Stand: Minimum assistance, Additional time (cues for hand placement) Sit to Supine: Maximum assistance, Assist x2 Gait: 
Distance (ft): 35 Feet (ft) Ambulation - Level of Assistance: Minimal assistance, Additional time Assistive Device: Walker, rolling, Gait belt Gait Abnormalities: Antalgic, Decreased step clearance, Step to gait ADL status overall composite: 
  
  
  
  
  
Physical Therapy ? Assessment and evaluation-bed mobility; functional transfers (bed, chair, bathroom, stairs); ambulation with equipment, car transfers, safety and ability to get out of house in the event of an emergency ? 50% weight bearing x 2 weeks then weight bearing as tolerated ? AVOID sudden and extreme movement of the hip (surgical leg) ? Discuss pain management ? Review how to do ADLs. Refer to pages 43-47 of handbook Home Exercise Program-refer to pages 36-42 of handbook Discharge Orders None Introducing [facility]! [employer] introduces [employer] patient portal. Now you can access parts of your medical record, email your doctor's office, and request medication refills online. 1. In your internet browser, go to [email or web address] 2. Click on the First Time User? Click Here link in the Sign In box. You will see the New Member Sign Up page. 3. Enter your TasteBook Access Code exactly as it appears below. You will not need to use this code after youve completed the sign-up process. If you do not sign up before the expiration date, you must request a new code. · TasteBook Access Code: [email or web address] Expires: 10/25/2017  9:09 AM 
 
 4. Enter the last four digits of your Social Security Number (xxxx) and Date of Birth (mm/dd/yyyy) as indicated and click Submit. You will be taken to the next sign-up page. 5. Create a [email or web address] ID. This will be your [email or web address] login ID and cannot be changed, so think of one that is secure and easy to remember. 6. Create a [email or web address] password. You can change your password at any time. 7. Enter your Password Reset Question and Answer. This can be used at a later time if you forget your password. 8. Enter your e-mail address. You will receive e-mail notification when new information is available in [address] 9. Click Sign Up. You can now view and download portions of your medical record. 10. Click the Download Summary menu link to download a portable copy of your medical information. If you have questions, please visit the Frequently Asked Questions section of the [email or web address] website. Remember, [email or web address] is NOT to be used for urgent needs. For medical emergencies, dial 911. Now available from your iPhone and Android! General Information Please provide this summary of care documentation to your next provider. Patient Signature:  ____________________________________________________________ Date:  ____________________________________________________________  
  
Gearldine Moulds Provider Signature:  ____________________________________________________________ Date:  ____________________________________________________________

## 2017-08-02 NOTE — PROGRESS NOTES
Problem: Mobility Impaired (Adult and Pediatric)  Goal: *Acute Goals and Plan of Care (Insert Text)  Physical Therapy Goals  Initiated 8/2/2017    1. Patient will move from supine to sit and sit to supine , scoot up and down and roll side to side in bed with modified independence within 4 days. 2. Patient will perform sit to stand with minimal assistance/contact guard assist within 4 days. 3. Patient will ambulate with minimal assistance/contact guard assist for 150 feet with the least restrictive device within 4 days. 4. Patient will perform hip home exercise program per protocol with independence within 4 days. PHYSICAL THERAPY EVALUATION  Patient: Montrell Rea [de-identified]77 y.o. female)  Date: 8/2/2017  Primary Diagnosis: DJD LEFT HIP   Primary osteoarthritis of left hip  Procedure(s) (LRB):  HIP ARTHROPLASTY LEFT TOTAL (Left) Day of Surgery   Precautions:   PWB, Fall (50%)      ASSESSMENT :  Based on the objective data described below, the patient presents with impaired sitting balance, generalized weakness, poor standing balance, hx of falls and general unsteadiness, and hx of shoulder injuries and \"weird arm movements\" per patient report. Patient received supine in bed, unable to hold arm still during BP due to what patient seemed to portray as involuntary/restless arm movements (?). Patient with seemingly foggy mentation vs. Limited effort for unknown reason (?) throughout session, initially moving well in bed, but required mod Ax2 to achieve sitting EOB. Once sitting EOB, poor sitting balance with posterior and lateral leans with little reaction to catch herself, requiring mod A to steady + max cues to keep eyes open (although this seemed voluntary as well). Eventually able to sit EOB with CGA. Sit<>stand trials x2, both with limited extension in posture and seemingly unable to maintain WB precautions.  Unable to follow cues for standing posture and patient impulsive/swaying and again keeping eyes closed, and giddy when PT asked her to keep them open for balance. Gait trial unsafe and patient returned to supine in bed. Patient may need rehab at d/c, as she lives alone and says her daughter will be staying with her \"for a few days\" upon d/c, however this may not be a safe d/c nor long enough support at home. Will continue to assess as patient progresses. Patient will benefit from skilled intervention to address the above impairments. Patients rehabilitation potential is considered to be Good  Factors which may influence rehabilitation potential include:   [ ]         None noted  [ ]         Mental ability/status  [ ]         Medical condition  [ ]         Home/family situation and support systems  [ ]         Safety awareness  [ ]         Pain tolerance/management  [ ]         Other:        PLAN :  Recommendations and Planned Interventions:  [ ]           Bed Mobility Training             [ ]    Neuromuscular Re-Education  [ ]           Transfer Training                   [ ]    Orthotic/Prosthetic Training  [ ]           Gait Training                         [ ]    Modalities  [ ]           Therapeutic Exercises           [ ]    Edema Management/Control  [ ]           Therapeutic Activities            [ ]    Patient and Family Training/Education  [ ]           Other (comment):     Frequency/Duration: Patient will be followed by physical therapy  twice daily to address goals. Discharge Recommendations: Rehab  Vs HHPT + 24 hr assist pending progress  Further Equipment Recommendations for Discharge: TBD       SUBJECTIVE:   Patient stated I have stenosis, knee arthritis, neck pain, both rotators in shoulders hurt.       OBJECTIVE DATA SUMMARY:   HISTORY:    Past Medical History:   Diagnosis Date    Arthritis      Asthma       MILD, LAST ATTACK 4 YRS AGO, NO INHALER USED    Cancer (HCC)       BREAST, LEFT    Chronic pain       LEFT HIP, RIGHT KNEE, SHOULDERS, NECK    Diverticulosis      GERD (gastroesophageal reflux disease)      H/O seasonal allergies      Hyperlipemia      Hypertension      Liver disease       FATTY LIVER, NON-ALCOHOLIC    PUD (peptic ulcer disease)      Sleep apnea       NO CPAP USED NOW    Thyroid goiter 2015     RADIATION TREATMENT     Past Surgical History:   Procedure Laterality Date    BREAST SURGERY PROCEDURE UNLISTED Left 01/1999     LUMPECTOMY W/ RADIATION    CARDIAC SURG PROCEDURE UNLIST   05/2017     CARDIAC CATH (DR MAJANO, [facility]); PATIENT STATES NO INTERVENTION NEEDED.  HX GI         COLONOSCOPY     Prior Level of Function/Home Situation: Patient using SPC PTA, however reports she \"almost falls\" and holds to the wall multiple times a day when questioned by PT. States her last fall was 3 months ago. Personal factors and/or comorbidities impacting plan of care: PMH     Home Situation  Home Environment: Private residence  # Steps to Enter: 0  One/Two Story Residence: Two story, live on 1st floor  # of Interior Steps: 12  Interior Rails: Left  Lift Chair Available: No  Living Alone: Yes  Support Systems: Child(tamanna)  Patient Expects to be Discharged to[de-identified] Private residence  Current DME Used/Available at Home: [employer] Servant, straight, Walker, rolling     EXAMINATION/PRESENTATION/DECISION MAKING:   Critical Behavior:  Neurologic State: Alert  Orientation Level: Oriented X4  Cognition: Appropriate decision making, Appropriate for age attention/concentration, Appropriate safety awareness, Follows commands  Hearing:   Auditory  Auditory Impairment: None  Hearing Aids/Status: Does not own  Range Of Motion:  AROM: Generally decreased, functional (bilateral rotator cuff injuries per patient report)  Strength:    Strength: Generally decreased, functional  Tone & Sensation:   Tone: Normal  Sensation: Intact  Coordination:  Coordination: Generally decreased, functional (\"some weird arm movements\" per patient)  Functional Mobility:  Bed Mobility:  Supine to Sit: Moderate assistance;Assist x2  Sit to Supine: Maximum assistance;Assist x2  Scooting: Maximum assistance;Assist x2  Transfers:  Sit to Stand: Minimum assistance; Moderate assistance;Assist x2; Additional time  Stand to Sit: Moderate assistance;Assist x2  Balance:   Sitting: Impaired; Without support  Sitting - Static: Fair (occasional)  Sitting - Dynamic: Fair (occasional)  Standing: Impaired; With support  Standing - Static: Fair;Poor  Standing - Dynamic :  (NT)  Functional Measure:  Tinetti test:      Sitting Balance: 0  Arises: 0  Attempts to Rise: 0  Immediate Standing Balance: 0  Standing Balance: 0  Nudged: 0  Eyes Closed: 0  Turn 360 Degrees - Continuous/Discontinuous: 0  Turn 360 Degrees - Steady/Unsteady: 0  Sitting Down: 0  Balance Score: 0  Indication of Gait: 0  R Step Length/Height: 0  L Step Length/Height: 0  R Foot Clearance: 0  L Foot Clearance: 0  Step Symmetry: 0  Step Continuity: 0  Path: 0  Trunk: 0  Walking Time: 0  Gait Score: 0  Total Score: 0         Tinetti Test and G-code impairment scale:  Percentage of Impairment CH     0%    CI     1-19% CJ     20-39% CK     40-59% CL     60-79% CM     80-99% CN      100%   Tinetti  Score 0-28 28 23-27 17-22 12-16 6-11 1-5 0          Tinetti Tool Score Risk of Falls  <19 = High Fall Risk  19-24 = Moderate Fall Risk  25-28 = Low Fall Risk  Tinetti ME. Performance-Oriented Assessment of Mobility Problems in Elderly Patients. [employer]; [id number]. (Scoring Description: PT Bulletin Feb. 10, 1993)     Older adults: Autumn Desai et al, 2009; n = 1000 [facility] elderly evaluated with ABC, KALEIGH, ADL, and IADL)  · Mean KALEIGH score for males aged 69-68 years = 26.21(3.40)  · Mean KALEIGH score for females age 69-68 years = 25.16(4.30)  · Mean KALEIGH score for males over 80 years = 23.29(6.02)  · Mean KALEIGH score for females over 80 years = 17.20(8.32)            G codes:   In compliance with CMSs Claims Based Outcome Reporting, the following G-code set was chosen for this patient based on their primary functional limitation being treated: The outcome measure chosen to determine the severity of the functional limitation was the Tinetti with a score of 0/28 which was correlated with the impairment scale. · Mobility - Walking and Moving Around:               - CURRENT STATUS:    CN - 100% impaired, limited or restricted               - GOAL STATUS:           CM - 80%-99% impaired, limited or restricted               - D/C STATUS:                       ---------------To be determined---------------      Physical Therapy Evaluation Charge Determination   History Examination Presentation Decision-Making   HIGH Complexity :3+ comorbidities / personal factors will impact the outcome/ POC  MEDIUM Complexity : 3 Standardized tests and measures addressing body structure, function, activity limitation and / or participation in recreation  LOW Complexity : Stable, uncomplicated  Other outcome measures Tinetti 0/28  HIGH       Based on the above components, the patient evaluation is determined to be of the following complexity level: LOW      Pain:  Pain Scale 1: Numeric (0 - 10)  Pain Intensity 1: 0  Pain Location 1: Shoulder;Hip  Pain Orientation 1: Left;Right  Pain Description 1: Throbbing  Activity Tolerance:   Fair- VSS , difficulty with keeping eyes open  Please refer to the flowsheet for vital signs taken during this treatment. After treatment:   [ ]         Patient left in no apparent distress sitting up in chair  [X]         Patient left in no apparent distress in bed  [X]         Call bell left within reach  [X]         Nursing notified  [ ]         Caregiver present  [ ]         Bed alarm activated      COMMUNICATION/EDUCATION:   The patients plan of care was discussed with: Registered Nurse.  [X]         Fall prevention education was provided and the patient/caregiver indicated understanding.   [X]         Patient/family have participated as able in goal setting and plan of care.  [X]         Patient/family agree to work toward stated goals and plan of care. [ ]         Patient understands intent and goals of therapy, but is neutral about his/her participation. [ ]         Patient is unable to participate in goal setting and plan of care.      Thank you for this referral.  Gricelda Hobson, PT, DPT   Time Calculation: 19 mins

## 2017-08-02 NOTE — IP AVS SNAPSHOT
[address] [facility] [address] [place] [address] 
[phone] Patient: Bobby Rea MRN: [id number] VFE:[id number] Current Discharge Medication List  
  
START taking these medications Dose & Instructions Dispensing Information Comments Morning Noon Evening Bedtime  
 celecoxib 200 mg capsule Commonly known as:  CeleBREX Your last dose was: Your next dose is:    
   
   
 Dose:  200 mg Take 1 Cap by mouth daily for 30 days. Quantity:  30 Cap Refills:  0  
     
   
   
   
  
 oxyCODONE IR 5 mg immediate release tablet Commonly known as:  Gabby Foster Your last dose was: Your next dose is:    
   
   
 Dose:  5 mg Take 1 Tab by mouth every four (4) hours as needed for Pain. Max Daily Amount: 30 mg.  
 Quantity:  60 Tab Refills:  0  
     
   
   
   
  
 rivaroxaban 10 mg tablet Commonly known as:  Stephy Zamora Your last dose was: Your next dose is:    
   
   
 Dose:  10 mg Take 1 Tab by mouth daily (with dinner) for 35 days. Indications: HIP SURGERY DEEP VEIN THROMBOSIS PREVENTION Quantity:  35 Tab Refills:  0 CONTINUE these medications which have NOT CHANGED Dose & Instructions Dispensing Information Comments Morning Noon Evening Bedtime  
 bisoprolol-hydroCHLOROthiazide 10-6.25 mg per tablet Commonly known as:  [facility] Your last dose was: Your next dose is:    
   
   
 Dose:  1 Tab Take 1 Tab by mouth two (2) times a day. Refills:  0  
     
   
   
   
  
 cholecalciferol 400 unit Tab tablet Commonly known as:  VITAMIN D3 Your last dose was: Your next dose is:    
   
   
 Dose:  800 Units Take 800 Units by mouth daily. Refills:  0 CLARITIN 10 mg tablet Generic drug:  loratadine Your last dose was:     
   
Your next dose is:    
   
   
 Dose:  10 mg  
 Take 10 mg by mouth daily as needed for Allergies. Refills:  0  
     
   
   
   
  
 CRESTOR 10 mg tablet Generic drug:  rosuvastatin Your last dose was: Your next dose is:    
   
   
 Dose:  10 mg Take 10 mg by mouth nightly. Refills:  0  
     
   
   
   
  
 multivitamin tablet Commonly known as:  ONE A DAY Your last dose was: Your next dose is:    
   
   
 Dose:  1 Tab Take 1 Tab by mouth daily. Refills:  0 OMEGA 3-6-9 FATTY ACIDS PO Your last dose was: Your next dose is: Take  by mouth daily. Refills:  0  
     
   
   
   
  
 omeprazole 20 mg capsule Commonly known as:  PRILOSEC Your last dose was: Your next dose is:    
   
   
 Dose:  20 mg Take 20 mg by mouth daily. Refills:  0  
     
   
   
   
  
 simethicone 80 mg chewable tablet Commonly known as:  Suni Lira Your last dose was: Your next dose is:    
   
   
 Dose:  80 mg Take 80 mg by mouth two (2) times daily as needed for Flatulence. Refills:  0 STOP taking these medications   
 traMADol 50 mg tablet Commonly known as:  ULTRAM  
   
  
  
  
Where to Get Your Medications Information on where to get these meds will be given to you by the nurse or doctor. ! Ask your nurse or doctor about these medications  
  celecoxib 200 mg capsule  
 oxyCODONE IR 5 mg immediate release tablet  
 rivaroxaban 10 mg tablet

## 2017-08-02 NOTE — PROGRESS NOTES
NP ORTHO PROGRESS NOTE  Post Op day: Day of Surgery    August 2, 2017 4:30 PM     Admit date: 8/2/2017  Date of Surgery: 8/2/2017   Procedures: Procedure(s):  HIP ARTHROPLASTY LEFT TOTAL  Admitting Physician: Elkin Castro MD   Surgeon: Moo Barnes) and Role:     Nelia Caba MD - Primary    Chart/Meds/Labs Reviewed  Current Facility-Administered Medications   Medication Dose Route Frequency    0.9% sodium chloride infusion  125 mL/hr IntraVENous CONTINUOUS    sodium chloride 0.9 % bolus infusion 500 mL  500 mL IntraVENous ONCE    [START ON 8/3/2017] sodium chloride (NS) flush 5-10 mL  5-10 mL IntraVENous Q8H    sodium chloride (NS) flush 5-10 mL  5-10 mL IntraVENous PRN    acetaminophen (TYLENOL) tablet 650 mg  650 mg Oral Q6H    acetaminophen (TYLENOL) tablet 650 mg  650 mg Oral Q6H PRN    celecoxib (CELEBREX) capsule 200 mg  200 mg Oral BID    HYDROmorphone (PF) (DILAUDID) injection 0.5 mg  0.5 mg IntraVENous Q4H PRN    naloxone (NARCAN) injection 0.4 mg  0.4 mg IntraVENous PRN    ceFAZolin in 0.9% NS (ANCEF) IVPB soln 2 g  2 g IntraVENous Q8H    [START ON 8/3/2017] dexamethasone (PF) (DECADRON) injection 10 mg  10 mg IntraVENous ONCE    ondansetron (ZOFRAN) injection 4 mg  4 mg IntraVENous Q4H PRN    hydrOXYzine HCl (ATARAX) tablet 10 mg  10 mg Oral Q8H PRN    famotidine (PEPCID) tablet 20 mg  20 mg Oral BID PRN    senna-docusate (PERICOLACE) 8.6-50 mg per tablet 1 Tab  1 Tab Oral BID    [START ON 8/3/2017] polyethylene glycol (MIRALAX) packet 17 g  17 g Oral DAILY    [START ON 8/4/2017] bisacodyl (DULCOLAX) suppository 10 mg  10 mg Rectal DAILY PRN    rivaroxaban (XARELTO) tablet 10 mg  10 mg Oral DAILY WITH DINNER    [START ON 8/3/2017] bisoprolol-hydroCHLOROthiazide (ZIAC) 10-6.25 mg per tablet 1 Tab  1 Tab Oral BID    [START ON 8/3/2017] loratadine (CLARITIN) tablet 10 mg  10 mg Oral DAILY PRN    [START ON 8/3/2017] pantoprazole (PROTONIX) tablet 40 mg  40 mg Oral ACB    simethicone (MYLICON) tablet 80 mg  80 mg Oral QID PRN    polyvinyl alcohol (LIQUIFILM TEARS) 1.4 % ophthalmic solution 1 Drop  1 Drop Both Eyes PRN    oxyCODONE IR (ROXICODONE) tablet 2.5 mg  2.5 mg Oral Q3H PRN    oxyCODONE IR (ROXICODONE) tablet 5-7.5 mg  5-7.5 mg Oral Q3H PRN    [START ON 8/3/2017] rosuvastatin (CRESTOR) tablet 10 mg  10 mg Oral QHS       Subjective:    Complaints: None  Denies Dizziness, CP, SOB, N/V, Abdominal pain, numbness or tingling of extremities. Able to perform ankle pumps easily. Has not been to EOB yet or seen by PT yet. Incisional pain control: well controlled, also with chronic back with some spinal stenosis. Took tramadol ~1 time daily & tylenol PTA  Pain Control:   Pain Assessment  Pain Scale 1: Numeric (0 - 10)  Pain Intensity 1: 0  Pain Location 1: Shoulder, Hip  Pain Orientation 1: Left, Right  Pain Description 1: Throbbing    Oral diet: Tolerating clears diet well    Objective:  General: Alert,Ox4, cooperative, NAD  HEENT: Atraumatic, PERRL, anicteric sclerae  Lungs: Bilateral expansion. Equal excursion. No accessory muscle use. Gastrointestinal:  Soft, non-tender, non-distended  Extremities:  Neurovasc exam WDL. + DP pulses. Sensation intact to light touch. Motor: + DF/PF          Calves non-tender upon palpation or with passive stretch. No significant erythema or swelling. Dressing: clean, dry and intact.   Sánchez draining clear leonid urine  NS infusing via IV  Vital Signs:   Visit Vitals    /82 (BP 1 Location: Right arm, BP Patient Position: At rest)    Pulse 62    Temp 97.5 °F (36.4 °C)    Resp 15    Ht 5' 2\" (1.575 m)    Wt 80.7 kg (178 lb)    SpO2 98%    BMI 32.56 kg/m2    O2 Flow Rate (L/min): 2 l/min O2 Device: Room air   Patient Vitals for the past 24 hrs:   BP Temp Pulse Resp SpO2 Height Weight   08/02/17 1557 121/82 97.5 °F (36.4 °C) 62 15 98 % - -   08/02/17 1425 125/63 97.6 °F (36.4 °C) (!) 59 14 96 % - - 17 1320 126/73 97.5 °F (36.4 °C) (!) 55 12 97 % - -   17 1300 137/67 - (!) 53 10 100 % - -   17 1245 127/66 97.5 °F (36.4 °C) (!) 58 12 100 % - -   17 1230 133/73 - 61 14 98 % - -   17 1215 127/67 - 64 13 100 % - -   17 1210 121/68 - 69 14 100 % - -   17 1205 - - 73 15 99 % - -   17 1203 110/61 97.6 °F (36.4 °C) 76 15 99 % - -   17 1200 110/61 - - - - - -   17 0743 138/85 98.6 °F (37 °C) (!) 58 18 99 % 5' 2\" (1.575 m) 80.7 kg (178 lb)     Temp (24hrs), Av.7 °F (36.5 °C), Min:97.5 °F (36.4 °C), Max:98.6 °F (37 °C)      Intake/output-last 8 hours:    07 -  1900  In: 1500 [I.V.:1500]  Out: 940 [Urine:740]    Intake/output- 24 hours:       LAB:   Recent Results (from the past 24 hour(s))   GLUCOSE, POC    Collection Time: 17  8:20 AM   Result Value Ref Range    Glucose (POC) 115 (H) 65 - 100 mg/dL    Performed by Bobo Heaton       Lab Results   Component Value Date/Time    INR 1.0 2017 12:05 PM     Lab Results   Component Value Date/Time    HGB 13.0 2017 12:05 PM     No results for input(s): NA, K, CL, BUN, CREA, GLU, CA, MG, PHOS, ALB, TBIL, TBILI, SGOT in the last 72 hours. No lab exists for component: ALT;3    PT/OT:   Gait:                    PATIENT MOBILITY                         Assessment and Plan    Principal Problem:    Primary osteoarthritis of left hip (2017)    Active Problems:    Obesity (BMI 30.0-34.9) (2017)          POD#0 Procedure(s):  HIP ARTHROPLASTY LEFT TOTAL  Stable postop. Watch hemodynamics with position changes. Labs trending as expected. VTE prophylaxis: Xarelto to start this evening, Mobilization, Ankle pumping exercises, SCDs per order in bed. Weight bearin% of operative leg. Pain management:  Multi-modal pain plan, see above for medication,  Ice packs & elevation of extremity per orders, active gentle ROM & mobilize frequently for short periods of time.    PT/OT start PT this evening. Wound benign. Neurovascularly intact. Progressing well. No indications of concerns. Continue present plans per orthopedic attending surgeon, Dr. Crys Barry, & interdisciplinary team for joint replacement. Discharge Planning: CM consult-- Patient notes that she lives alone & presently not sure what would be best since she doesn't think any family will be available. Await PT & OT  Evaluations & discussion with Dr. Crys Barry.     Signed By: John Quiroz NP    RN, MSN, MA, Adult NP-BC

## 2017-08-02 NOTE — OP NOTES
[address] [facility] [address]   OP NOTE       Name:  Grace Paez   MR#:  [id number]   :  1940   Account #:  [de-identified]    Surgery Date:  2017   Date of Adm:  2017       PREOPERATIVE DIAGNOSIS:  Advanced degenerative arthritis of left   hip. POSTOPERATIVE DIAGNOSIS:  Advanced degenerative arthritis of   left hip. PROCEDURES PERFORMED:  Left total hip replacement. ESTIMATED BLOOD LOSS: 200 mL. SPECIMENS REMOVED: Left femoral head. ANESTHESIA:  Spinal.     DRAINS: None. COMPLICATIONS: None. ASSISTANT: Rachel Iglesias PA-C.    INDICATIONS: The patient has advanced degenerative arthritis of her   left hip and now presents for the above procedure. She also has   degenerative changes of her knees. DESCRIPTION OF PROCEDURE: On the day of operation, the patient   was taken to the operating room and spinal anesthesia administered. The patient was then turned to the right lateral decubitus position and   positioned with [facility] positioner. The left hip was prepped and   draped in the usual fashion. A mini posterolateral incision was made   over the hip. It was carried through subcutaneous tissue. Tensor fascia   brittny was sharply divided. Fascia of the gluteal muscles was divided in   line with their fibers. Charnley retractors were carefully placed. External rotator muscles were taken down. A T-shaped posterior   capsular incision made. The hip was dislocated, noting marked   degenerative changes. Oscillating saw was used to make the femoral   neck osteotomy. Retractors were inserted around the   acetabulum. Thee acetabulum was cleared of soft tissue and   osteophytes. The acetabulum was then reamed to 51 mm and felt to   have good coverage and good bone quality at this point. A 52 mm   [place] [employer] acetabular component was   press-fit into place and felt to have a tight fit.  One superior screw was   placed and a 36 mm trial liner placed. Attention was then turned to the   femur. Box osteotome was utilized. The femur was then reamed to a   #4 in the Cedar system. It was broached to a #4 in the [employer]edar   system. X-ray revealed proper position of the components. Various   head and neck trials were utilized, +5 standard neck provided the best   fit, range of motion, with proper stability in all planes of motion. Some   anterior tissue was removed to prevent impingement. The trials were   dislocated and removed. Attention was then turned to the acetabulum. A 36 mm polyethylene liner was placed in the acetabulum. Attention   was turned to the femur. The femur was thoroughly irrigated with pulse   irrigation as well as antibiotic solution. The #4 standard [employer]   Porocoated femoral stem was press-fit into place and felt to have a   tight fit. The +5 ceramic head was introduced and the hip reduced. It   was felt to be stable with proper leg lengths. Incisions were thoroughly   irrigated, coagulation achieved with electrocautery. Posterior capsule   repaired with #1 Vicryl suture. Fascia closed with #1 Vicryl. This was   also supplemented with #2 PDS. 2-0 Vicryl was used to close   subcutaneous tissue and staples used to close the skin. Sterile   dressings applied. The patient taken to the recovery room in   satisfactory condition. Garfield Kaye PA-C, assisted me with positioning, retraction,   implant installation, and incision closure. MD Queenie Watkins   D:  08/02/2017   17:46   T:  08/02/2017   19:16   Job #:  [id number]

## 2017-08-02 NOTE — ANESTHESIA PROCEDURE NOTES
Spinal Block    Start time: 8/2/2017 9:30 AM  End time: 8/2/2017 9:38 AM  Performed by: Lele Wood  Authorized by: Eileen SIMON     Pre-procedure:    Timeout Time: 09:30          Spinal Block:   Patient Position:  Seated  Prep Region:  Lumbar  Prep: Betadine      Location:  L3-4  Technique:  Single shot        Needle:     Needle Gauge:  22 G  Attempts:  1      Events: CSF confirmed, no blood with aspiration and no paresthesia        Assessment:  Insertion:  Uncomplicated  Patient tolerance:  Patient tolerated the procedure well with no immediate complications

## 2017-08-02 NOTE — PERIOP NOTES
TRANSFER - OUT REPORT:    Verbal report given to Selene Galeana RN on Varghese Rea  being transferred to room 575 for routine post - op       Report consisted of patients Situation, Background, Assessment and   Recommendations(SBAR). Time Pre op antibiotic given:2 gram ancef 0942  Anesthesia Stop time: 0090  Sánchez Present on Transfer to floor:yes  Order for Sánchez on Chart:yes    Information from the following report(s) SBAR, OR Summary, Intake/Output and MAR was reviewed with the receiving nurse. Opportunity for questions and clarification was provided. Is the patient on 02? YES       L/Min 2       Other     Is the patient on a monitor? NO    Is the nurse transporting with the patient? NO    Surgical Waiting Area notified of patient's transfer from PACU? YES      The following personal items collected during your admission accompanied patient upon transfer:   Dental Appliance: Dental Appliances: None  Vision:    Hearing Aid:    Jewelry: Jewelry: None  Clothing: Clothing: Footwear, Pants, Undergarments, Socks, Shirt (sent to [employer])  Other Valuables:  Other Valuables: None  Valuables sent to safe:

## 2017-08-03 LAB
ANION GAP BLD CALC-SCNC: 6 MMOL/L (ref 5–15)
BUN SERPL-MCNC: 13 MG/DL (ref 6–20)
BUN/CREAT SERPL: 16 (ref 12–20)
CALCIUM SERPL-MCNC: 8.1 MG/DL (ref 8.5–10.1)
CHLORIDE SERPL-SCNC: 107 MMOL/L (ref 97–108)
CO2 SERPL-SCNC: 25 MMOL/L (ref 21–32)
CREAT SERPL-MCNC: 0.79 MG/DL (ref 0.55–1.02)
GLUCOSE BLD STRIP.AUTO-MCNC: 135 MG/DL (ref 65–100)
GLUCOSE BLD STRIP.AUTO-MCNC: 207 MG/DL (ref 65–100)
GLUCOSE SERPL-MCNC: 141 MG/DL (ref 65–100)
HGB BLD-MCNC: 9.1 G/DL (ref 11.5–16)
POTASSIUM SERPL-SCNC: 3.9 MMOL/L (ref 3.5–5.1)
SERVICE CMNT-IMP: ABNORMAL
SERVICE CMNT-IMP: ABNORMAL
SODIUM SERPL-SCNC: 138 MMOL/L (ref 136–145)

## 2017-08-03 PROCEDURE — G8988 SELF CARE GOAL STATUS: HCPCS

## 2017-08-03 PROCEDURE — 74011250637 HC RX REV CODE- 250/637: Performed by: PHYSICIAN ASSISTANT

## 2017-08-03 PROCEDURE — 36415 COLL VENOUS BLD VENIPUNCTURE: CPT | Performed by: PHYSICIAN ASSISTANT

## 2017-08-03 PROCEDURE — 74011250637 HC RX REV CODE- 250/637: Performed by: NURSE PRACTITIONER

## 2017-08-03 PROCEDURE — 97116 GAIT TRAINING THERAPY: CPT

## 2017-08-03 PROCEDURE — 97535 SELF CARE MNGMENT TRAINING: CPT

## 2017-08-03 PROCEDURE — 85018 HEMOGLOBIN: CPT | Performed by: PHYSICIAN ASSISTANT

## 2017-08-03 PROCEDURE — 97530 THERAPEUTIC ACTIVITIES: CPT

## 2017-08-03 PROCEDURE — 74011250636 HC RX REV CODE- 250/636: Performed by: NURSE PRACTITIONER

## 2017-08-03 PROCEDURE — 80048 BASIC METABOLIC PNL TOTAL CA: CPT | Performed by: PHYSICIAN ASSISTANT

## 2017-08-03 PROCEDURE — G8987 SELF CARE CURRENT STATUS: HCPCS

## 2017-08-03 PROCEDURE — 97110 THERAPEUTIC EXERCISES: CPT

## 2017-08-03 PROCEDURE — 82962 GLUCOSE BLOOD TEST: CPT

## 2017-08-03 PROCEDURE — 97165 OT EVAL LOW COMPLEX 30 MIN: CPT

## 2017-08-03 PROCEDURE — 74011250636 HC RX REV CODE- 250/636: Performed by: PHYSICIAN ASSISTANT

## 2017-08-03 PROCEDURE — 65270000029 HC RM PRIVATE

## 2017-08-03 RX ORDER — OXYCODONE HYDROCHLORIDE 5 MG/1
5 TABLET ORAL
Qty: 60 TAB | Refills: 0 | Status: SHIPPED | OUTPATIENT
Start: 2017-08-03

## 2017-08-03 RX ORDER — CELECOXIB 200 MG/1
200 CAPSULE ORAL DAILY
Qty: 30 CAP | Refills: 0 | Status: SHIPPED | OUTPATIENT
Start: 2017-08-03 | End: 2017-09-02

## 2017-08-03 RX ORDER — SODIUM CHLORIDE 9 MG/ML
125 INJECTION, SOLUTION INTRAVENOUS CONTINUOUS
Status: DISPENSED | OUTPATIENT
Start: 2017-08-03 | End: 2017-08-03

## 2017-08-03 RX ADMIN — DOCUSATE SODIUM AND SENNOSIDES 1 TABLET: 8.6; 5 TABLET, FILM COATED ORAL at 09:27

## 2017-08-03 RX ADMIN — OXYCODONE HYDROCHLORIDE 5 MG: 5 TABLET ORAL at 07:24

## 2017-08-03 RX ADMIN — PANTOPRAZOLE SODIUM 40 MG: 40 TABLET, DELAYED RELEASE ORAL at 07:23

## 2017-08-03 RX ADMIN — SODIUM CHLORIDE 125 ML/HR: 900 INJECTION, SOLUTION INTRAVENOUS at 13:03

## 2017-08-03 RX ADMIN — RIVAROXABAN 10 MG: 10 TABLET, FILM COATED ORAL at 18:50

## 2017-08-03 RX ADMIN — ACETAMINOPHEN 650 MG: 325 TABLET, FILM COATED ORAL at 01:48

## 2017-08-03 RX ADMIN — OXYCODONE HYDROCHLORIDE 5 MG: 5 TABLET ORAL at 21:54

## 2017-08-03 RX ADMIN — CELECOXIB 200 MG: 200 CAPSULE ORAL at 09:28

## 2017-08-03 RX ADMIN — CEFAZOLIN 2 G: 1 INJECTION, POWDER, FOR SOLUTION INTRAMUSCULAR; INTRAVENOUS; PARENTERAL at 01:48

## 2017-08-03 RX ADMIN — CELECOXIB 200 MG: 200 CAPSULE ORAL at 18:50

## 2017-08-03 RX ADMIN — ACETAMINOPHEN 650 MG: 325 TABLET, FILM COATED ORAL at 07:23

## 2017-08-03 RX ADMIN — ROSUVASTATIN CALCIUM 10 MG: 10 TABLET ORAL at 21:55

## 2017-08-03 RX ADMIN — OXYCODONE HYDROCHLORIDE 5 MG: 5 TABLET ORAL at 04:07

## 2017-08-03 RX ADMIN — DEXAMETHASONE SODIUM PHOSPHATE 10 MG: 10 INJECTION, SOLUTION INTRAMUSCULAR; INTRAVENOUS at 09:28

## 2017-08-03 RX ADMIN — SODIUM CHLORIDE 125 ML/HR: 900 INJECTION, SOLUTION INTRAVENOUS at 04:16

## 2017-08-03 RX ADMIN — BISOPROLOL FUMARATE AND HYDROCHLOROTHIAZIDE 1 TABLET: 6.25; 1 TABLET ORAL at 18:49

## 2017-08-03 RX ADMIN — ACETAMINOPHEN 650 MG: 325 TABLET, FILM COATED ORAL at 19:30

## 2017-08-03 RX ADMIN — OXYCODONE HYDROCHLORIDE 5 MG: 5 TABLET ORAL at 18:49

## 2017-08-03 RX ADMIN — DOCUSATE SODIUM AND SENNOSIDES 1 TABLET: 8.6; 5 TABLET, FILM COATED ORAL at 18:50

## 2017-08-03 RX ADMIN — ACETAMINOPHEN 650 MG: 325 TABLET, FILM COATED ORAL at 14:51

## 2017-08-03 NOTE — PROGRESS NOTES
TOTAL HIP PROGRESS NOTE      Subjective:     Post-Operative Day: 1 Status Post left Total Hip Arthroplasty  Systemic or Specific Complaints:No Complaints    Objective:     Patient Vitals for the past 24 hrs:   BP Temp Pulse Resp SpO2   08/03/17 0830 106/57 98.2 °F (36.8 °C) 60 16 98 %   08/03/17 0316 114/71 98 °F (36.7 °C) 62 16 99 %   08/02/17 2344 104/51 99 °F (37.2 °C) (!) 56 16 97 %   08/02/17 2021 116/62 98.1 °F (36.7 °C) 65 16 99 %   08/02/17 1712 134/78 98.6 °F (37 °C) 65 15 96 %   08/02/17 1638 134/75 - 63 - -   08/02/17 1557 121/82 97.5 °F (36.4 °C) 62 15 98 %   08/02/17 1425 125/63 97.6 °F (36.4 °C) (!) 59 14 96 %   08/02/17 1320 126/73 97.5 °F (36.4 °C) (!) 55 12 97 %   08/02/17 1300 137/67 - (!) 53 10 100 %   08/02/17 1245 127/66 97.5 °F (36.4 °C) (!) 58 12 100 %   08/02/17 1230 133/73 - 61 14 98 %   08/02/17 1215 127/67 - 64 13 100 %   08/02/17 1210 121/68 - 69 14 100 %   08/02/17 1205 - - 73 15 99 %   08/02/17 1203 110/61 97.6 °F (36.4 °C) 76 15 99 %   08/02/17 1200 110/61 - - - -       General: alert, cooperative, no distress, appears stated age   Wound: Wound clean and dry no evidence of infection. , No Erythema, No Edema, No Drainage and Wound Intact   Motion: Painless Range of Motion   DVT Exam: No evidence of DVT seen on physical exam.  Negative Michael's sign. No cords or calf tenderness. No significant calf/ankle edema.      Data Review:    Recent Results (from the past 24 hour(s))   GLUCOSE, POC    Collection Time: 08/02/17  5:13 PM   Result Value Ref Range    Glucose (POC) 149 (H) 65 - 100 mg/dL    Performed by [address] [place] [address], [facility]    Collection Time: 08/03/17  4:09 AM   Result Value Ref Range    Sodium 138 136 - 145 mmol/L    Potassium 3.9 3.5 - 5.1 mmol/L    Chloride 107 97 - 108 mmol/L    CO2 25 21 - 32 mmol/L    Anion gap 6 5 - 15 mmol/L    Glucose 141 (H) 65 - 100 mg/dL    BUN 13 6 - 20 MG/DL    Creatinine 0.79 0.55 - 1.02 MG/DL    BUN/Creatinine ratio 16 12 - 20 GFR est AA >60 >60 ml/min/1.73m2    GFR est non-AA >60 >60 ml/min/1.73m2    Calcium 8.1 (L) 8.5 - 10.1 MG/DL   HEMOGLOBIN    Collection Time: 08/03/17  4:09 AM   Result Value Ref Range    HGB 9.1 (L) 11.5 - 16.0 g/dL   GLUCOSE, POC    Collection Time: 08/03/17  6:32 AM   Result Value Ref Range    Glucose (POC) 135 (H) 65 - 100 mg/dL    Performed by Janina Negrete          Assessment:     Status Post left Total Hip Arthroplasty. Doing well postoperatively. .  Bandage aquacell, clean/dry. Labs stable. PT progressing well with walking. Pain control WNL. Plan:     Continues current post-op course  Continue PT per protocol. Patient preference is rehab for discharge.

## 2017-08-03 NOTE — PROGRESS NOTES
Problem: Mobility Impaired (Adult and Pediatric)  Goal: *Acute Goals and Plan of Care (Insert Text)  Physical Therapy Goals  Initiated 8/2/2017    1. Patient will move from supine to sit and sit to supine , scoot up and down and roll side to side in bed with modified independence within 4 days. 2. Patient will perform sit to stand with minimal assistance/contact guard assist within 4 days. 3. Patient will ambulate with minimal assistance/contact guard assist for 150 feet with the least restrictive device within 4 days. 4. Patient will perform hip home exercise program per protocol with independence within 4 days. PHYSICAL THERAPY TREATMENT  Patient: Denny Fernandes [place] [de-identified]77 y.o. female)  Date: 8/3/2017  Diagnosis: DJD LEFT HIP   Primary osteoarthritis of left hip Primary osteoarthritis of left hip  Procedure(s) (LRB):  HIP ARTHROPLASTY LEFT TOTAL (Left) 1 Day Post-Op  Precautions: PWB, Fall (50%)      ASSESSMENT:  Patient progressing well with therapy goals and mobility. No abnormal movements noted with UE as presented during evaluation yesterday and increased alertness. Patient with improvements in bed mobility and tolerated gait training well. Min A overall but with frequent cues for sequencing and safety. Tolerated supine hip exercises well. Expect her to continue to progress well but safety concerns with her returning home alone. She reports she has family that will be able to assist her but it would be infrequently and they wouldn't be able to stay with her. She would benefit from a short rehab stay to improve safety and independence before returning home. Patient in agreement   Progression toward goals:  [X]      Improving appropriately and progressing toward goals  [ ]      Improving slowly and progressing toward goals  [ ]      Not making progress toward goals and plan of care will be adjusted       PLAN:  Patient continues to benefit from skilled intervention to address the above impairments.   Continue treatment per established plan of care. Discharge Recommendations:  Rehab vs [facility] for rehab  Further Equipment Recommendations for Discharge:  None expected        SUBJECTIVE:   Patient stated I am doing a little better today.       OBJECTIVE DATA SUMMARY:   Critical Behavior:  Neurologic State: Alert  Orientation Level: Oriented X4  Cognition: Appropriate decision making, Appropriate for age attention/concentration, Appropriate safety awareness, Follows commands     Functional Mobility Training:  Bed Mobility:  Supine to Sit: Minimum assistance; Additional time  Scooting: Contact guard assistance; Additional time     Transfers:  Sit to Stand: Minimum assistance; Additional time (cues for hand placement)  Stand to Sit: Minimum assistance; Additional time      transfers from bed and bathroom commode      Balance:  Sitting: Impaired  Sitting - Static: Good (unsupported)  Sitting - Dynamic: Fair (occasional)  Standing: Impaired  Standing - Static: Fair  Standing - Dynamic : Fair  Ambulation/Gait Training:  Distance (ft): 35 Feet (ft)  Assistive Device: Walker, rolling;Gait belt  Ambulation - Level of Assistance: Minimal assistance; Additional time  Gait Abnormalities: Antalgic;Decreased step clearance; Step to gait  Left Side Weight Bearing: Partial (%) (50%)  Base of Support: Widened  Stance: Left decreased;Time;Weight shift  Speed/Delphine: Pace decreased (<100 feet/min)  Step Length: Right shortened;Left shortened                 Patient slow but steady with use of RW. Frequent cues for walker positioning and sequencing.       Therapeutic Exercises:   SUPINE  EXERCISES   Sets   Reps   Active Active Assist   Passive Self ROM   Comments   Ankle Pumps 1 10 [X]                                        [ ]                                        [ ]                                        [ ]                                            Quad Sets 1 10 [X]                                        [ ] [ ]                                        [ ]                                            Heel Slides 1 8 [ ]                                        [X]                                        [ ]                                        [ ]                                            Hip Abduction 1 10 [X]                                        [ ]                                        [ ]                                        [ ]                                            Glut Sets 1 10 [X]                                        [ ]                                        [ ]                                        [ ]                                               Pain:  Pain Scale 1: Numeric (0 - 10)  Pain Intensity 1: 5  Pain Location 1: Hip  Pain Orientation 1: Left  Pain Description 1: Aching  Pain Intervention(s) 1: Medication (see MAR); Ice  Activity Tolerance:   No s/s of VS distress   After treatment:   [X] Patient left in no apparent distress sitting up in chair  [ ] Patient left in no apparent distress in bed  [X] Call bell left within reach  [X] Nursing notified  [ ] Caregiver present  [ ] Bed alarm activated      COMMUNICATION/COLLABORATION:   The patients plan of care was discussed with: Registered Nurse     Sabiha Reid PT, DPT   Time Calculation: 36 mins

## 2017-08-03 NOTE — PROGRESS NOTES
Bedside and Verbal shift change report given to SOFI Fernandez (oncoming nurse) by Tana Martinez RN (offgoing nurse). Report included the following information SBAR, Kardex, OR Summary, Intake/Output, MAR and Recent Results.

## 2017-08-03 NOTE — PROGRESS NOTES
Problem: Mobility Impaired (Adult and Pediatric)  Goal: *Acute Goals and Plan of Care (Insert Text)  Physical Therapy Goals  Initiated 8/2/2017    1. Patient will move from supine to sit and sit to supine , scoot up and down and roll side to side in bed with modified independence within 4 days. 2. Patient will perform sit to stand with minimal assistance/contact guard assist within 4 days. 3. Patient will ambulate with minimal assistance/contact guard assist for 150 feet with the least restrictive device within 4 days. 4. Patient will perform hip home exercise program per protocol with independence within 4 days. PHYSICAL THERAPY TREATMENT  Patient: Negra Parker [place] [de-identified]77 y.o. female)  Date: 8/3/2017  Diagnosis: DJD LEFT HIP   Primary osteoarthritis of left hip Primary osteoarthritis of left hip  Procedure(s) (LRB):  HIP ARTHROPLASTY LEFT TOTAL (Left) 1 Day Post-Op  Precautions: Fall, PWB (avoid sudden and extreme movements of the surgical hip)  ASSESSMENT:  Patient in chair on arrival and eager for PT. Demonstrated good improvements in ambulation and tolerated progressing to 80 ft. Increased time required due to fatigue and c/o BUE weakness/soreness. Patient daughters present and with multiple questions about plan for therapy progression and rehab placement. Answered all of patient and her daughters questions. Progression toward goals:  [X]      Improving appropriately and progressing toward goals  [ ]      Improving slowly and progressing toward goals  [ ]      Not making progress toward goals and plan of care will be adjusted       PLAN:  Patient continues to benefit from skilled intervention to address the above impairments. Continue treatment per established plan of care.   Discharge Recommendations:  [facility]  Further Equipment Recommendations for Discharge:  Defer to SNF       SUBJECTIVE:   Patient pleasant and agreeable to PT      OBJECTIVE DATA SUMMARY:   Functional Mobility Training:  Bed Mobility:   Not assessed; patient in chair on arrival   Transfers:  Sit to Stand: Minimum assistance  Stand to Sit: Contact guard assistance     Ambulation/Gait Training:  Distance (ft): 80 Feet (ft)  Assistive Device: Walker, rolling;Gait belt  Ambulation - Level of Assistance: Minimal assistance; Additional time  Gait Abnormalities: Antalgic;Decreased step clearance; Step to gait  Left Side Weight Bearing: Partial (%) (50%)  Base of Support: Widened  Stance: Left decreased;Time;Weight shift  Speed/Delphine: Pace decreased (<100 feet/min)  Step Length: Right shortened;Left shortened                 Patient slow but steady. Initial cues for walker advancement to not allow for feet to advance ahead of walker. Therapeutic Exercises:   Exercises performed per protocol. See morning treatment note for description. Pain:  Pain Scale 1: Numeric (0 - 10)  Pain Intensity 1: 3  Pain Location 1: Hip  Pain Orientation 1: Left  Pain Description 1: Aching  Pain Intervention(s) 1: Medication (see MAR); Ice  Activity Tolerance:   No s/s of VS distress   After treatment:   [X] Patient left in no apparent distress sitting up in chair  [ ] Patient left in no apparent distress in bed  [X] Call bell left within reach  [X] Nursing notified  [X] Caregiver present  [ ] Bed alarm activated      COMMUNICATION/COLLABORATION:   The patients plan of care was discussed with: Registered Nurse     Jimmy Mcclelland PT, DPT   Time Calculation: 26 mins

## 2017-08-03 NOTE — PROGRESS NOTES
Care Management Interventions  PCP Verified by CM: Yes (Dr. Tom Hernández)  Palliative Care Consult (Criteria: CHF and RRAT>21): No  Reason for No Palliative Care Consult: Other (see comment) (no needs/no orders)  Mode of Transport at Discharge: BLS  Transition of Care Consult (CM Consult): SNF (The patient's family/friends were given the SNF list)  MyChart Signup: No  Discharge Durable Medical Equipment: No  Physical Therapy Consult: Yes  Occupational Therapy Consult: Yes  Speech Therapy Consult: No  Current Support Network: Own Home, Lives Alone  Confirm Follow Up Transport: Family  Plan discussed with Pt/Family/Caregiver: Yes  Freedom of Choice Offered: Yes  Discharge Location  Discharge Placement: Skilled nursing facility    Chart reviewed. VALENTINA spoke with Dr. Paolo Bay and this patient would like to go to rehab/SNF. [facility] met with the patient's and her 2 friends in the room. She has a daughter, but she is working. CRM explained role and educated the patient about the Medicare skilled benefit. CRM provided the SNF list. The patient's friends plan to tour 3 facilities and get back to CRM by tomorrow with their choice. CRM will follow for discharge planning. This patient is a bundle.  Lennie

## 2017-08-03 NOTE — PROGRESS NOTES
..Bedside and Verbal shift change report given to Anna Hoffman (oncoming nurse) by Lokesh Gabriel (offgoing nurse). Report included the following information SBAR.

## 2017-08-03 NOTE — PROGRESS NOTES
Problem: Discharge Planning  Goal: *Discharge to safe environment  Outcome: Progressing Towards Goal  SNF placement

## 2017-08-03 NOTE — PROGRESS NOTES
Problem: Self Care Deficits Care Plan (Adult)  Goal: *Acute Goals and Plan of Care (Insert Text)  Occupational Therapy Goals  Initiated: 8/3/2017    1. Patient will perform grooming with supervision/set-up standing at sink within 3 day(s). 2. Patient will perform bathing with supervision/set-up from chair within 3 day(s). 3. Patient will perform upper body dressing and lower body dressing with supervision/set-up within 3 day(s). 4. Patient will perform toilet transfers with supervision/set-up within 3 day(s). 5. Patient will perform all aspects of toileting with supervision/set-up within 3 day(s). OCCUPATIONAL THERAPY EVALUATION  Patient: Karly Rea [de-identified]77 y.o. female)  Date: 8/3/2017  Primary Diagnosis: DJD LEFT HIP   Primary osteoarthritis of left hip  Procedure(s) (LRB):  HIP ARTHROPLASTY LEFT TOTAL (Left) 1 Day Post-Op   Precautions:   Fall, PWB (avoid sudden and extreme movements of the surgical hip)      ASSESSMENT :  Based on the objective data described below, the patient presents with decreased independence with self care and functional mobility following admission for L THR. Pt was able to progress OOB with cues and instructions for techniques and then for transfer to commode. Pt does wish to obtain a hip kit but may wait until discharge to rehab setting as some settings do provided AE. Pt will benefit from lower body dressing training using AE to maximize her independence with self care and functional mobility. Pt does have some support from family but is limited at this time. Recommend discharge to rehab setting to ensure independence with all ADL and IADL activities. Patient will benefit from skilled intervention to address the above impairments.   Patients rehabilitation potential is considered to be Good  Factors which may influence rehabilitation potential include:   [X]             None noted  [ ]             Mental ability/status  [ ]             Medical condition  [ ] Home/family situation and support systems  [ ]             Safety awareness  [ ]             Pain tolerance/management  [ ]             Other:        PLAN :  Recommendations and Planned Interventions:  [X]               Self Care Training                  [X]        Therapeutic Activities  [X]               Functional Mobility Training    [ ]        Cognitive Retraining  [X]               Therapeutic Exercises           [X]        Endurance Activities  [X]               Balance Training                   [ ]        Neuromuscular Re-Education  [ ]               Visual/Perceptual Training     [X]   Home Safety Training  [X]               Patient Education                 [X]        Family Training/Education  [ ]               Other (comment):     Frequency/Duration: Patient will be followed by occupational therapy 5 times a week to address goals. Discharge Recommendations: Rehab  Further Equipment Recommendations for Discharge: TBD       SUBJECTIVE:   Patient stated I am feeling pretty good after I got a really good nap this afternoon.       OBJECTIVE DATA SUMMARY:   HISTORY:   Past Medical History:   Diagnosis Date    Arthritis      Asthma       MILD, LAST ATTACK 4 YRS AGO, NO INHALER USED    Cancer (HCC)       BREAST, LEFT    Chronic pain       LEFT HIP, RIGHT KNEE, SHOULDERS, NECK    Diverticulosis      GERD (gastroesophageal reflux disease)      H/O seasonal allergies      Hyperlipemia      Hypertension      Liver disease       FATTY LIVER, NON-ALCOHOLIC    PUD (peptic ulcer disease)      Sleep apnea       NO CPAP USED NOW    Thyroid goiter 2015     RADIATION TREATMENT     Past Surgical History:   Procedure Laterality Date    BREAST SURGERY PROCEDURE UNLISTED Left 01/1999     LUMPECTOMY W/ RADIATION    CARDIAC SURG PROCEDURE UNLIST   05/2017     CARDIAC C[place] ([place] MAJANO, [facility] [place] PATIENT STATES NO INTERVENTION NEEDED.     HX GI         COLONOSCOPY        Prior Level of Function/Home Situation: pt was independent with basic ADL activity prior to surgery. She was limited with mobility due to pain and was using a cane. She does continue to drive and completed grocery shopping tasks. Expanded or extensive additional review of patient history:      Home Situation  Home Environment: Private residence  # Steps to Enter: 0  One/Two Story Residence: Two story, live on 1st floor  # of Interior Steps: 12  Interior Rails: Left  Lift Chair Available: No  Living Alone: Yes  Support Systems: Child(tamanna)  Patient Expects to be Discharged to[de-identified] Private residence  Current DME Used/Available at Home: [employer], straight  Tub or Shower Type: Shower  [X]  Right hand dominant             [ ]  Left hand dominant     EXAMINATION OF PERFORMANCE DEFICITS:  Cognitive/Behavioral Status:  Neurologic State: Alert  Orientation Level: Oriented X4  Cognition: Appropriate for age attention/concentration  Perception: Appears intact  Perseveration: No perseveration noted  Safety/Judgement: Good awareness of safety precautions     Skin: see nursing notes     Edema: none noted     Hearing: Auditory  Auditory Impairment: None  Hearing Aids/Status: Does not own     Vision/Perceptual:                           Acuity: Within Defined Limits          Range of Motion:     AROM: Within functional limits  PROM: Within functional limits                       Strength:     Strength: Within functional limits                 Coordination:  Coordination: Within functional limits  Fine Motor Skills-Upper: Right Intact; Left Intact    Gross Motor Skills-Upper: Right Intact; Left Intact     Tone & Sensation:     Tone: Normal  Sensation: Intact                       Balance:  Sitting: Intact  Sitting - Static: Good (unsupported)  Sitting - Dynamic: Good (unsupported)  Standing: Intact; With support  Standing - Static: Good  Standing - Dynamic : Good     Functional Mobility and Transfers for ADLs:  Bed Mobility:  Supine to Sit: Additional time;Minimum assistance  Sit to Supine:  (remained in chair)  Scooting: Contact guard assistance; Additional time     Transfers:  Sit to Stand: Minimum assistance  Stand to Sit: Minimum assistance  Bed to Chair: Minimum assistance  Toilet Transfer : Minimum assistance     ADL Assessment:  Feeding: Supervision     Oral Facial Hygiene/Grooming: Supervision     Bathing: Moderate assistance     Upper Body Dressing: Supervision     Lower Body Dressing: Moderate assistance     Toileting: Minimum assistance                 ADL Intervention and task modifications:   pt was able to complete bed mobility with cues and instructions. Pt did very good with all tasks. She was able to progress to bathroom and given cues for safety. Pt was able to void bladder. Nursing no notified. Pt assisted back to chair following intervention. Cognitive Retraining  Safety/Judgement: Good awareness of safety precautions     Functional Measure:  Barthel Index:      Bathin  Bladder: 10  Bowels: 10  Groomin  Dressin  Feeding: 10  Mobility: 5  Stairs: 0  Toilet Use: 5  Transfer (Bed to Chair and Back): 10  Total: 60         Barthel and G-code impairment scale:  Percentage of impairment CH  0% CI  1-19% CJ  20-39% CK  40-59% CL  60-79% CM  80-99% CN  100%   Barthel Score 0-100 100 99-80 79-60 59-40 20-39 1-19    0   Barthel Score 0-20 20 17-19 13-16 9-12 5-8 1-4 0      The Barthel ADL Index: Guidelines  1. The index should be used as a record of what a patient does, not as a record of what a patient could do. 2. The main aim is to establish degree of independence from any help, physical or verbal, however minor and for whatever reason. 3. The need for supervision renders the patient not independent. 4. A patient's performance should be established using the best available evidence. Asking the patient, friends/relatives and nurses are the usual sources, but direct observation and common sense are also important.  However direct testing is not needed. 5. Usually the patient's performance over the preceding 24-48 hours is important, but occasionally longer periods will be relevant. 6. Middle categories imply that the patient supplies over 50 per cent of the effort. 7. Use of aids to be independent is allowed. Marzella Mis., Barthel, D.W. (9292). Functional evaluation: the Barthel Index. [address] [facility] (14)2. Michael Rogers torrey AUBREY Pedro, Wong Fontana.Christiano., Gab, [facility] (1999). Measuring the change indisability after inpatient rehabilitation; comparison of the responsiveness of the Barthel Index and Functional [place] Measure. Journal of Neurology, Neurosurgery, and Psychiatry, 66(4), 834-136. Hannah Deras, N.J.A, AUGUST Lock, & Liliya Starkey M.A. (2004.) Assessment of post-stroke quality of life in cost-effectiveness studies: The usefulness of the Barthel Index and the EuroQoL-5D. Quality of Life Research, 13, 299-80      G codes: In compliance with CMSs Claims Based Outcome Reporting, the following G-code set was chosen for this patient based on their primary functional limitation being treated: The outcome measure chosen to determine the severity of the functional limitation was the Barthel Index with a score of 60/100 which was correlated with the impairment scale.       · Self Care:               - CURRENT STATUS:    CJ - 20%-39% impaired, limited or restricted               - GOAL STATUS:           CI - 1%-19% impaired, limited or restricted               - D/C STATUS:                       ---------------To be determined---------------      Occupational Therapy Evaluation Charge Determination   History Examination Decision-Making   LOW Complexity : Brief history review  MEDIUM Complexity : 3-5 performance deficits relating to physical, cognitive , or psychosocial skils that result in activity limitations and / or participation restrictions LOW Complexity : No comorbidities that affect functional and no verbal or physical assistance needed to complete eval tasks       Based on the above components, the patient evaluation is determined to be of the following complexity level: LOW   Pain:  Pain Scale 1: Numeric (0 - 10)  Pain Intensity 1: 3  Pain Location 1: Hip  Pain Orientation 1: Left  Pain Description 1: Aching  Pain Intervention(s) 1: Medication (see MAR); Ice  Activity Tolerance:   VSS throughout session. After treatment:   [X] Patient left in no apparent distress sitting up in chair  [ ] Patient left in no apparent distress in bed  [X] Call bell left within reach  [X] Nursing notified  [ ] Caregiver present  [ ] Bed alarm activated      COMMUNICATION/EDUCATION:   The patients plan of care was discussed with: Physical Therapist and Registered Nurse.  [X] Home safety education was provided and the patient/caregiver indicated understanding. [X] Patient/family have participated as able in goal setting and plan of care. [ ] Patient/family agree to work toward stated goals and plan of care. [ ] Patient understands intent and goals of therapy, but is neutral about his/her participation. [ ] Patient is unable to participate in goal setting and plan of care. This patients plan of care is appropriate for delegation to DEVANTE.      Thank you for this referral.  Jeanne Hernandez OT  Time Calculation: 30 mins

## 2017-08-03 NOTE — PROGRESS NOTES
Bedside shift change report given to Rebecca (oncoming nurse) by Hema Olivo (offgoing nurse). Report included the following information SBAR and Kardex.

## 2017-08-04 LAB
GLUCOSE BLD STRIP.AUTO-MCNC: 118 MG/DL (ref 65–100)
HGB BLD-MCNC: 8.1 G/DL (ref 11.5–16)
SERVICE CMNT-IMP: ABNORMAL

## 2017-08-04 PROCEDURE — 65270000029 HC RM PRIVATE

## 2017-08-04 PROCEDURE — 82962 GLUCOSE BLOOD TEST: CPT

## 2017-08-04 PROCEDURE — 74011250637 HC RX REV CODE- 250/637: Performed by: PHYSICIAN ASSISTANT

## 2017-08-04 PROCEDURE — 74011250637 HC RX REV CODE- 250/637: Performed by: NURSE PRACTITIONER

## 2017-08-04 PROCEDURE — 85018 HEMOGLOBIN: CPT | Performed by: PHYSICIAN ASSISTANT

## 2017-08-04 PROCEDURE — 97116 GAIT TRAINING THERAPY: CPT

## 2017-08-04 PROCEDURE — 36415 COLL VENOUS BLD VENIPUNCTURE: CPT | Performed by: PHYSICIAN ASSISTANT

## 2017-08-04 RX ORDER — OXYCODONE HYDROCHLORIDE 5 MG/1
5 TABLET ORAL
Status: DISCONTINUED | OUTPATIENT
Start: 2017-08-04 | End: 2017-08-05 | Stop reason: HOSPADM

## 2017-08-04 RX ADMIN — BISACODYL 10 MG: 10 SUPPOSITORY RECTAL at 14:42

## 2017-08-04 RX ADMIN — CELECOXIB 200 MG: 200 CAPSULE ORAL at 17:52

## 2017-08-04 RX ADMIN — Medication 10 ML: at 21:05

## 2017-08-04 RX ADMIN — ACETAMINOPHEN 650 MG: 325 TABLET, FILM COATED ORAL at 05:48

## 2017-08-04 RX ADMIN — Medication 10 ML: at 14:00

## 2017-08-04 RX ADMIN — RIVAROXABAN 10 MG: 10 TABLET, FILM COATED ORAL at 17:53

## 2017-08-04 RX ADMIN — PANTOPRAZOLE SODIUM 40 MG: 40 TABLET, DELAYED RELEASE ORAL at 07:12

## 2017-08-04 RX ADMIN — ROSUVASTATIN CALCIUM 10 MG: 10 TABLET ORAL at 21:05

## 2017-08-04 RX ADMIN — OXYCODONE HYDROCHLORIDE 5 MG: 5 TABLET ORAL at 05:58

## 2017-08-04 RX ADMIN — DOCUSATE SODIUM AND SENNOSIDES 1 TABLET: 8.6; 5 TABLET, FILM COATED ORAL at 09:18

## 2017-08-04 RX ADMIN — BISOPROLOL FUMARATE AND HYDROCHLOROTHIAZIDE 1 TABLET: 6.25; 1 TABLET ORAL at 17:53

## 2017-08-04 RX ADMIN — CELECOXIB 200 MG: 200 CAPSULE ORAL at 09:18

## 2017-08-04 RX ADMIN — POLYETHYLENE GLYCOL 3350 17 G: 17 POWDER, FOR SOLUTION ORAL at 10:14

## 2017-08-04 NOTE — PROGRESS NOTES
TOTAL HIP PROGRESS NOTE      Subjective:     Post-Operative Day: 3 Status Post left Total Hip Arthroplasty  Systemic or Specific Complaints:No Complaints    Objective:     Patient Vitals for the past 24 hrs:   BP Temp Pulse Resp SpO2   08/04/17 0248 112/70 98 °F (36.7 °C) 60 18 94 %   08/03/17 2025 119/69 98.2 °F (36.8 °C) 67 16 96 %   08/03/17 1849 130/72 - 68 - -   08/03/17 1451 135/71 98.9 °F (37.2 °C) 69 16 99 %   08/03/17 0830 106/57 98.2 °F (36.8 °C) 60 16 98 %       General: alert, cooperative, no distress, appears stated age   Wound: Wound clean and dry no evidence of infection. , No Erythema, No Edema, No Drainage and Wound Intact   Motion: Painless Range of Motion   DVT Exam: No evidence of DVT seen on physical exam.  Negative Michael's sign. No cords or calf tenderness. No significant calf/ankle edema. Data Review:    Recent Results (from the past 24 hour(s))   GLUCOSE, POC    Collection Time: 08/03/17  4:18 PM   Result Value Ref Range    Glucose (POC) 207 (H) 65 - 100 mg/dL    Performed by Sergio Lawrence 68    Collection Time: 08/04/17  5:49 AM   Result Value Ref Range    HGB 8.1 (L) 11.5 - 16.0 g/dL   GLUCOSE, POC    Collection Time: 08/04/17  7:01 AM   Result Value Ref Range    Glucose (POC) 118 (H) 65 - 100 mg/dL    Performed by Francesco Faulkner (PCT)          Assessment:     Status Post left Total Hip Arthroplasty. Doing well postoperatively. Bandage aquacell,without issue. Labs stable. PT progressing well. Pain control WNL. Plan:     Continues current post-op course  Continue PT per protocol. Plan to discharge to [facility] [place] possible tomorrow?

## 2017-08-04 NOTE — PROGRESS NOTES
Physical Therapy  Chart reviewed. Attempted to see for PT PM session. She was received walking back from bathroom with nursing staff. Pt reports she has been getting up and walking to the bathroom constantly this afternoon due to have difficulties with BM. Pt reported feeling weak/tired and sat EOB. Assessed vitals which were all WNL. Pt declined further ambulation in the luevano at this time due to feeling poor. Encouraged her to continue mobilizing with nursing staff and to be in the chair for all meals. PT will f/u tomorrow for progression. Plans are to discharge tomorrow to SNF. Thanks!   Heriberto Camargo, PT

## 2017-08-04 NOTE — PROGRESS NOTES
CM met with pt and pt is undecided on discharge plan. She would like to talk to her family this evening. She has decided between [facility], [facility], or Home. Referrals have been sent to [facility] and [facility]. CM will need to be paged Saturday with update on destination.   Melania Zendejas RN CRM

## 2017-08-04 NOTE — PROGRESS NOTES
..Bedside and Verbal shift change report given to Greer Olivares (oncoming nurse) by Zulma Kaur (offgoing nurse). Report included the following information SBAR.

## 2017-08-04 NOTE — PROGRESS NOTES
VALENTINA followed up with the patient at 2pm for SNF choice. She stated that her family is still out looking at facilities. CRM will follow back up with her before 4pm. (discharge is planned for tomorrow) ASTON MONTGOMERY spoke with patient again regarding SNF choice 2:50pm. The patient requested CRM to call her daughter Patt Harris [phone] and LVM. Will follow. ASTON MONTGOMERY checked back with the patient regarding SNF choice 3:35. CRM has not heard from the daughter. The patient stated that the daughter was at [facility]. The patient requested that CRM go ahead and send the referral . CRM sent to [facility] via [employer]. The patient stated that she could go by car via family tomorrow if they have a bed. Discharge folder on the hard chart with report # [phone]. CRM will need to follow up with the facility to make sure they have a bed. This was added to the weekend hand off sheet. ASTON MONTGOMERY spoke with the patient at 4pm. She may decided to [facility]. She has chosen [facility] if needed.  CRM will need to follow up with this patient regarding the discharge plan in the am. (CRM added to the weekend handoff) ASTON

## 2017-08-04 NOTE — PROGRESS NOTES
Problem: Mobility Impaired (Adult and Pediatric)  Goal: *Acute Goals and Plan of Care (Insert Text)  Physical Therapy Goals  Initiated 8/2/2017    1. Patient will move from supine to sit and sit to supine , scoot up and down and roll side to side in bed with modified independence within 4 days. 2. Patient will perform sit to stand with minimal assistance/contact guard assist within 4 days. 3. Patient will ambulate with minimal assistance/contact guard assist for 150 feet with the least restrictive device within 4 days. 4. Patient will perform hip home exercise program per protocol with independence within 4 days. PHYSICAL THERAPY TREATMENT  Patient: Jackie Rea [de-identified]77 y.o. female)  Date: 8/4/2017  Diagnosis: DJD LEFT HIP   Primary osteoarthritis of left hip Primary osteoarthritis of left hip  Procedure(s) (LRB):  HIP ARTHROPLASTY LEFT TOTAL (Left) 2 Days Post-Op  Precautions: Fall, PWB (avoid sudden and extreme movements of the surgical hip)      ASSESSMENT:  Patient was received in the chair and was agreeable to working with PT this morning to progress ambulation. She stood and ambulated with CGA, and required gait belt and RW to ambulate. She demonstrated decreased foot clearance, poor weight shift, shortened step length, significantly slowed gait speed,  and antalgic gait while walking. She was educated on RW management while walking and encouraged to keep walker at safe distance. She returned to the chair and was left with all needs met, agreeable to working with PT this afternoon. Progression toward goals:  [X]      Improving appropriately and progressing toward goals  [ ]      Improving slowly and progressing toward goals  [ ]      Not making progress toward goals and plan of care will be adjusted       PLAN:  Patient continues to benefit from skilled intervention to address the above impairments. Continue treatment per established plan of care.   Discharge Recommendations:  Rehab  Further Equipment Recommendations for Discharge:  TBD       SUBJECTIVE:   Patient stated I am slow but i'm moving.       OBJECTIVE DATA SUMMARY:   Critical Behavior:  Neurologic State: Alert  Orientation Level: Oriented X4  Cognition: Appropriate for age attention/concentration  Safety/Judgement: Good awareness of safety precautions  Functional Mobility Training:  Bed Mobility:                    Transfers:  Sit to Stand: Contact guard assistance  Stand to Sit: Contact guard assistance                             Balance:  Sitting: Intact  Standing: Intact; With support  Ambulation/Gait Training:  Distance (ft): 100 Feet (ft)  Assistive Device: Walker, rolling;Gait belt  Ambulation - Level of Assistance: Minimal assistance; Additional time     Gait Description (WDL): Exceptions to WDL  Gait Abnormalities: Antalgic;Decreased step clearance; Step to gait     Left Side Weight Bearing: Partial (%) (50)  Base of Support: Widened  Stance: Left decreased;Time;Weight shift  Speed/Delphine: Pace decreased (<100 feet/min)  Step Length: Right shortened;Left shortened                               Stairs:                    Pain:  Pain Scale 1: Numeric (0 - 10)  Pain Intensity 1: 4  Pain Location 1: Hip  Pain Orientation 1: Left  Pain Description 1: Aching  Pain Intervention(s) 1: Medication (see MAR); Ice  Activity Tolerance:   No apparent distress      Please refer to the flowsheet for vital signs taken during this treatment. After treatment:   [X] Patient left in no apparent distress sitting up in chair  [ ] Patient left in no apparent distress in bed  [X] Call bell left within reach  [X] Nursing notified  [ ] Caregiver present  [ ] Bed alarm activated      COMMUNICATION/COLLABORATION:   The patients plan of care was discussed with: Registered Nurse     TOYA Frank    Time Calculation: 14 mins      Regarding student involvement in patient care:  A student participated in this treatment session.  Per CMS Medicare statements and APTA guidelines I certify that the following was true:  1. I was present and directly observed the entire session. 2. I made all skilled judgments and clinical decisions regarding care. 3. I am the practitioner responsible for assessment, treatment, and documentation.

## 2017-08-04 NOTE — PROGRESS NOTES
Occupational Therapy 3167    Chart reviewed and attempted to see pt. Pt received in the bathroom on the commode trying to have a BM stating that she has been trying all afternoon and would like more time to sit. Pt plans to d/c to SNF tomorrow. Per PT, pt has been moving well with RW. Will follow up for treatment session as able and appropriate. Thank you.     Gisell Montalvo OTS

## 2017-08-05 VITALS
RESPIRATION RATE: 16 BRPM | HEART RATE: 58 BPM | BODY MASS INDEX: 32.76 KG/M2 | DIASTOLIC BLOOD PRESSURE: 78 MMHG | HEIGHT: 62 IN | SYSTOLIC BLOOD PRESSURE: 127 MMHG | WEIGHT: 178 LBS | OXYGEN SATURATION: 97 % | TEMPERATURE: 97.9 F

## 2017-08-05 PROCEDURE — 74011250637 HC RX REV CODE- 250/637: Performed by: PHYSICIAN ASSISTANT

## 2017-08-05 PROCEDURE — 74011250637 HC RX REV CODE- 250/637: Performed by: NURSE PRACTITIONER

## 2017-08-05 PROCEDURE — 97116 GAIT TRAINING THERAPY: CPT

## 2017-08-05 RX ADMIN — OXYCODONE HYDROCHLORIDE 5 MG: 5 TABLET ORAL at 06:34

## 2017-08-05 RX ADMIN — DOCUSATE SODIUM AND SENNOSIDES 1 TABLET: 8.6; 5 TABLET, FILM COATED ORAL at 08:36

## 2017-08-05 RX ADMIN — PANTOPRAZOLE SODIUM 40 MG: 40 TABLET, DELAYED RELEASE ORAL at 06:34

## 2017-08-05 RX ADMIN — ACETAMINOPHEN 650 MG: 325 TABLET, FILM COATED ORAL at 12:31

## 2017-08-05 RX ADMIN — ACETAMINOPHEN 650 MG: 325 TABLET, FILM COATED ORAL at 06:34

## 2017-08-05 RX ADMIN — OXYCODONE HYDROCHLORIDE 5 MG: 5 TABLET ORAL at 12:31

## 2017-08-05 RX ADMIN — BISOPROLOL FUMARATE AND HYDROCHLOROTHIAZIDE 1 TABLET: 6.25; 1 TABLET ORAL at 08:36

## 2017-08-05 RX ADMIN — ACETAMINOPHEN 650 MG: 325 TABLET, FILM COATED ORAL at 01:28

## 2017-08-05 RX ADMIN — Medication 10 ML: at 06:34

## 2017-08-05 RX ADMIN — CELECOXIB 200 MG: 200 CAPSULE ORAL at 08:36

## 2017-08-05 NOTE — PROGRESS NOTES
Problem: Mobility Impaired (Adult and Pediatric)  Goal: *Acute Goals and Plan of Care (Insert Text)  Physical Therapy Goals  Initiated 8/2/2017    1. Patient will move from supine to sit and sit to supine , scoot up and down and roll side to side in bed with modified independence within 4 days. 2. Patient will perform sit to stand with minimal assistance/contact guard assist within 4 days. 3. Patient will ambulate with minimal assistance/contact guard assist for 150 feet with the least restrictive device within 4 days. 4. Patient will perform hip home exercise program per protocol with independence within 4 days. PHYSICAL THERAPY TREATMENT  Patient: Jackie Rea [de-identified]77 y.o. female)  Date: 8/5/2017  Diagnosis: DJD LEFT HIP   Primary osteoarthritis of left hip Primary osteoarthritis of left hip  Procedure(s) (LRB):  HIP ARTHROPLASTY LEFT TOTAL (Left) 3 Days Post-Op  Precautions: Fall, PWB      ASSESSMENT:  Patient cleared by nursing to participate with PT. She is motivated to participate with therapy and reports she plans to discharge today to [facility] for rehab. Her bed mobility continues to improve. She is able to maintain her weight bearing status, but fatigues easily and quickly. Her ambulation is slow. She had question about stairs, but that will be addressed in rehab. Anticipate continued progress and is ready for discharge. Progression toward goals:  [X]      Improving appropriately and progressing toward goals  [ ]      Improving slowly and progressing toward goals  [ ]      Not making progress toward goals and plan of care will be adjusted       PLAN:  Patient continues to benefit from skilled intervention to address the above impairments. Continue treatment per established plan of care. Discharge Recommendations:  Rehab  Further Equipment Recommendations for Discharge:  TBD - patient being set up to go to [facility]. SUBJECTIVE:   Patient stated About how long will we be?  I haven't ordered breakfast yet and I'm hungry. Asha Muhammad  The patient stated 3/3 hip precautions. Reviewed all 3 with patient. OBJECTIVE DATA SUMMARY:   Critical Behavior:  Neurologic State: Alert  Orientation Level: Oriented X4  Cognition: Appropriate decision making  Safety/Judgement: Insight into deficits  Functional Mobility Training:  Bed Mobility:  Rolling: Stand-by asssistance  Supine to Sit: Stand-by asssistance     Scooting: Stand-by asssistance        Transfers:  Sit to Stand: Supervision  Stand to Sit: Modified independent        Bed to Chair: Stand-by asssistance                    Balance:  Sitting - Static: Good (unsupported)  Sitting - Dynamic: Good (unsupported)  Standing - Static: Fair  Standing - Dynamic : Fair  Ambulation/Gait Training:  Distance (ft): 125 Feet (ft)  Assistive Device: Gait belt;Walker, rolling  Ambulation - Level of Assistance: Contact guard assistance        Gait Abnormalities: Antalgic  Right Side Weight Bearing: Full  Left Side Weight Bearing: Partial (%) (50%)  Base of Support: Shift to right  Stance: Left decreased  Speed/Delphine: Slow  Step Length: Left shortened;Right shortened  Swing Pattern: Left asymmetrical                               Pain:  Pain Scale 1: Numeric (0 - 10)  Pain Intensity 1: 4  Pain Location 1: Hip  Pain Orientation 1: Left  Pain Description 1: Aching  Pain Intervention(s) 1: Repositioned  Activity Tolerance:   Fatigues quickly. Please refer to the flowsheet for vital signs taken during this treatment.   After treatment:   [X]  Patient left in no apparent distress sitting up in chair  [ ]  Patient left in no apparent distress in bed  [X]  Call bell left within reach  [X]  Nursing notified  [ ]  Caregiver present  [ ]  Bed alarm activated      COMMUNICATION/COLLABORATION:   The patients plan of care was discussed with: Registered Nurse     Frederic Alexis, PT   Time Calculation: 14 mins

## 2017-08-05 NOTE — PROGRESS NOTES
TRANSFER - OUT REPORT:    Verbal report given to Hattie(name) on Melissa Rea  being transferred to [facility]    Report consisted of patients Situation, Background, Assessment and   Recommendations(SBAR). Information from the following report(s) SBAR, Kardex, Intake/Output, MAR and Recent Results was reviewed with the receiving nurse. Opportunity for questions and clarification was provided.

## 2017-08-05 NOTE — PROGRESS NOTES
Bedside shift change report given to Jorge Chen (oncoming nurse) by Tanya Scott (offgoing nurse). Report included the following information SBAR and Kardex.

## 2017-08-05 NOTE — DISCHARGE INSTRUCTIONS
Patient meets criteria for   BUNDLED PAYMENT   for Care Improvement Initiative Criteria    Contact Information for Orthopedic Nurse Navigator:      RONEL Watkins, RN-BC  F:[phone]  F:[phone]  H:[phone]    DC Orders All Total Hips    Case Management for DC planning to __________ Rehab . - PT 2 times a week for 2 weeks; 50% WBAT with AVOID sudden and extreme movement of your hip (surgical leg). - Xarelto daily therapy x 35 days.   - Remove staples at 2 weeks post-op; 8/16/17.   - Follow up in Office in 2 1/2 weeks. After Hospital Care Plan:  Discharge Instructions Hip Replacement-Dr. Ruth Hernandez    Patient Name: Burna Favre Shorter  Date of procedure: 8/2/2017    Procedure: Procedure(s):  HIP ARTHROPLASTY LEFT TOTAL  Surgeon: Benjamín Atkins) and Role:     * Vivian Powell MD - Primary     PCP: Anupam Montoya MD  Date of discharge: No discharge date for patient encounter. Follow up appointments   Follow up with Dr. Ruth Hernandez in 2 ½ weeks. Call [phone] to make an appointment.  If home health has been arranged for you the agency will contact you to arrange dates/times for visits. Please call them if you do not hear from them within 24 hours after you are discharged    When to call your Orthopaedic Surgeon: Call [phone].  If you call after 5pm or on a weekend, the on call physician will be contacted   Increased hip pain, unrelieved pain or if you have difficulty or are unable to walk   Signs of infection-if your incision is red; continues to have drainage; drainage has a foul odor or if you have a persistent fever over 101 degrees   Signs of a blood clot in your leg-calf pain, tenderness, redness, swelling of lower leg    When to call your Primary Care Physician:   Concerns about medical conditions such as diabetes, high blood pressure, asthma, congestive heart failure   Call if blood sugars are elevated, persistent headache or dizziness, coughing or congestion, constipation or diarrhea, burning with urination, abnormal heart rate    When to call 911and go to the nearest emergency room   acute onset of chest pain, shortness of breath, difficulty breathing    Activity   50% weight bearing with walker or crutches for 2 weeks, then as tolerated. Refer to pages 23-33 of your handbook for instructions and pictures   Complete you Home Exercise Program daily as instructed by your therapist. Refer to pages 36-42 of your handbook for instructions and pictures   Get up every one hour and walk (except at night when sleeping)   AVOID sudden and extreme movement of your hip (surgical leg)   Do not drive or operate heavy machinery    Incision Care   The Aquacel (brown, waterproof) surgical dressing is to remain on your hip for 7 days. On the 7th day have someone gently peel the dressing off by carefully lifting the edge and stretching it slightly to break the adhesive seal   You will have staples in your hip incision. They will be removed by the home health agency staff   If your Aquacel dressing comes loose/off before the 7th day, you may replace it with a dry sterile gauze dressing; change it daily. Once your incision is not draining, you may leave it open to air   You may take a shower with the Aquacel dressing in place. Once the Aquacel is removed, you may shower and get your incision wet but do not submerge your incision under water in a bath tub, hot tub or swimming pool for 6 weeks after surgery. Preventing blood clots   Take Xarelto as prescribed by Dr. Melody Damian for one month following surgery   Wear elastic stockings (TEDS) for 4 weeks. You should remove them for approximately 1 hour daily for showering/sponge bathing    Pain management   Take pain medication as prescribed; decrease the amount you use as your pain lessens   Avoid alcoholic beverages while taking pain medication   Please be aware that many medications contain Tylenol.   We do not want you to over medicate so please read the information below as a guide. Do not take more than 3 Grams of Tylenol in a 24 hour period. (There are 1000 milligrams in one Gram)  o 325 mg of Tylenol per tablet (do not take more than 9 tablets in 24 hours)  o 500 mg of Tylenol per tablet (do not take more than 6 tablets in 24 hours)   You may place an ice bag on your hip for 15-20 minutes after exercising and as needed throughout the day and night    Diet   Resume usual diet; drink plenty of fluids; eat foods high in fiber   You may want to take a stool softener (such as Senokot-S or Colace) to prevent constipation while you are taking pain medication. If constipation occurs, take a laxative (such as Dulcolax tablets, Milk of Magnesia, or a suppository)    [facility] Protocol ([facility])  Nursing-see physicians order   Remove staples per physicians order   Complete head to toe assessment, vital signs   Medication reconciliation   Review pain management   Manage chronic medical conditions    Physical Therapy-see physician's order     Weight bearing status:  Precautions at Admission: PWB, Fall (50%)  Left Side Weight Bearing: Partial (%) (50%)     ?   Mobility Status:  Supine to Sit: Minimum assistance, Additional time  Sit to Stand: Minimum assistance, Additional time (cues for hand placement)  Sit to Supine: Maximum assistance, Assist x2       Gait:  Distance (ft): 35 Feet (ft)  Ambulation - Level of Assistance: Minimal assistance, Additional time  Assistive Device: Walker, rolling, Gait belt  Gait Abnormalities: Antalgic, Decreased step clearance, Step to gait    ADL status overall composite:                 Physical Therapy   Assessment and evaluation-bed mobility; functional transfers (bed, chair, bathroom, stairs); ambulation with equipment, car transfers, safety and ability to get out of house in the event of an emergency   50% weight bearing x 2 weeks then weight bearing as tolerated   AVOID sudden and extreme movement of the hip (surgical leg)   Discuss pain management   Review how to do ADLs.   Refer to pages 43-47 of handbook    Home Exercise Program-refer to pages 36-42 of handbook

## 2017-08-05 NOTE — PROGRESS NOTES
Patient has selected to go to [address] [place] [address] spoke with Hawkramandeep Rowan @ [phone] who states they can accept patient today. Patient will go into private room 130. Writer spoke with patient and her daughter Lilly Koenig this morning. After review of PT notes, there is no indication that patient will warrant ambulance transport. Nor is this patients desire. Patient's other daughter will come pick patient up at noon to transport her to [facility]. Writer spoke with patients nurse to update her as well. Patient packet is on her bedside chart. No other patient needs identified at this time.  Celina Neves LCSW

## 2017-08-05 NOTE — ROUTINE PROCESS
Bedside shift change report given to SOFI junior (oncoming nurse) by Jean-Paul Duran RN (offgoing nurse). Report included the following information SBAR, Kardex and MAR.

## 2017-08-05 NOTE — PROGRESS NOTES
I have reviewed discharge instructions with the patient and daughter. The patient and daughter verbalized understanding. Pt was d/c to [facility] by daughters. Pt was received hard rxs.

## 2017-08-05 NOTE — PROGRESS NOTES
[address]  SBAR Bundled Payment Handoff     FROM:                                TO: [facility]                                                      ([address] [place] [address] or Facility name)  [address]  [address] [id number]  Dept: [address]: [phone]                                      Room#:  575/01                                                      Discharging Nurse:  Selene Reyna  Unit PJ#:[phone]         SITUATION      ASAScore: ASA 3 - Patient with moderate systemic disease with functional limitations    Admitted:  8/2/2017  Hospital Day: 4      Attending Provider:  Scottie Padilla MD     Consultations:  None    PCP:  Natalia Lozano MD   [phone]     Admitting Dx:  DJD LEFT HIP   Primary osteoarthritis of left hip       Principal Problem:    Primary osteoarthritis of left hip (8/1/2017)    Active Problems:    Obesity (BMI 30.0-34.9) (7/26/2017)      3 Days Post-Op of   Procedure(s):  HIP ARTHROPLASTY LEFT TOTAL   BY: Scottie Padilla MD             ON: 8/2/2017                  Code Status: Full Code             Advance Directive? No Doesnt Have (Send w/patient)     Isolation:  There are currently no Active Isolations       MDRO: No current active infections    BACKGROUND     Allergies:   Allergies   Allergen Reactions    Lipitor [Atorvastatin] Hives and Itching       Past Medical History:   Diagnosis Date    Arthritis     Asthma     MILD, LAST ATTACK 4 YRS AGO, NO INHALER USED    Cancer (HCC)     BREAST, LEFT    Chronic pain     LEFT HIP, RIGHT KNEE, SHOULDERS, NECK    Diverticulosis     GERD (gastroesophageal reflux disease)     H/O seasonal allergies     Hyperlipemia     Hypertension     Liver disease     FATTY LIVER, NON-ALCOHOLIC    PUD (peptic ulcer disease)     Sleep apnea     NO CPAP USED NOW    Thyroid goiter 2015    RADIATION TREATMENT       Past Surgical History:   Procedure Laterality Date    BREAST SURGERY PROCEDURE UNLISTED Left 01/1999    LUMPECTOMY W/ RADIATION    CARDIAC SURG PROCEDURE UNLIST  05/2017    CARDIAC CATH (DR MAJANO, [place] [facility]); PATIENT STATES NO INTERVENTION NEEDED.  HX GI      COLONOSCOPY       Prior to Admission Medications   Prescriptions Last Dose Informant Patient Reported? Taking? FSH/FLX/PRIM/CUR/BOR/OM3,6,9 5 (OMEGA 3-6-9 FATTY ACIDS PO) 7/27/2017  Yes No   Sig: Take  by mouth daily. bisoprolol-hydroCHLOROthiazide [facility]) 10-6.25 mg per tablet 8/2/2017 at 0630  Yes Yes   Sig: Take 1 Tab by mouth two (2) times a day. cholecalciferol (VITAMIN D3) 400 unit tab tablet 7/27/2017  Yes No   Sig: Take 800 Units by mouth daily. loratadine (CLARITIN) 10 mg tablet 8/1/2017 at Unknown time  Yes Yes   Sig: Take 10 mg by mouth daily as needed for Allergies. multivitamin (ONE A DAY) tablet 7/27/2017  Yes No   Sig: Take 1 Tab by mouth daily. omeprazole (PRILOSEC) 20 mg capsule 8/2/2017 at 0630  Yes Yes   Sig: Take 20 mg by mouth daily. rosuvastatin (CRESTOR) 10 mg tablet 8/1/2017 at Unknown time  Yes Yes   Sig: Take 10 mg by mouth nightly. simethicone (MYLICON) 80 mg chewable tablet 8/1/2017 at Unknown time  Yes Yes   Sig: Take 80 mg by mouth two (2) times daily as needed for Flatulence. traMADol (ULTRAM) 50 mg tablet 8/2/2017 at 0630  Yes Yes   Sig: Take 100 mg by mouth every six (6) hours as needed for Pain. Facility-Administered Medications: None       Vaccinations: There is no immunization history on file for this patient. ASSESSMENT   Age: 77 y.o.              Gender: female        Height: Height: 5' 2\" (157.5 cm)                    Weight:Weight: 80.7 kg (178 lb)     Patient Vitals for the past 8 hrs:   Temp Pulse Resp BP SpO2   08/05/17 0831 98.2 °F (36.8 °C) (!) 58 16 131/71 92 %            Active Orders   Diet    DIET REGULAR       Orientation: Orientation Level: Oriented X4    Active Lines/Drains:  (Peg Tube / Sánchez / CL or S/L?):no    Urinary Status: Voiding      Last BM: 8/5/17     Skin Integrity: Incision (comment)   Wound Hip Left-DRESSING STATUS: Clean, dry, and intact    Wound Hip Left-DRESSING TYPE: Silver products (aquacell)    Mobility: Slightly limited   Weight Bearing Status: WBAT (Weight Bearing as Tolerated)      Gait Training  Assistive Device: Gait belt, Walker, rolling  Ambulation - Level of Assistance: Contact guard assistance  Distance (ft): 125 Feet (ft)     On Anticoagulation? YES  Xarelto                                      Last dose:  8/4/17 at 17:53    Pain Medications given:  Oxycodone 5 mg                                Last dose: 8/5/17 at  12:31    Lab Results   Component Value Date/Time    Glucose 141 08/03/2017 04:09 AM    Hemoglobin A1c 6.5 07/27/2017 12:05 PM    INR 1.0 07/27/2017 12:05 PM    HGB 8.1 08/04/2017 05:49 AM    HGB 9.1 08/03/2017 04:09 AM    HGB 13.0 07/27/2017 12:05 PM       Readmission Risks: Falls  Score: 8     RECOMMENDATION     See After Visit Summary (AVS) for:  · Discharge instructions  · After [address]   · Medication Reconciliation          [facility] Orthopaedic Nurse Navigator  RONEL Lora, RN-BC       Office  [phone]  Cell      [phone]  Fax      [phone]  [email or web address]             . Elena

## 2017-08-07 NOTE — DISCHARGE SUMMARY
Discharge Summary    Physician: Felipe Lafleur MD    Physician Assistant: Wilfred Woods PA-C    Admit Diagnosis:  DJD LEFT HIP   Primary osteoarthritis of left hip    Final Diagnosis:   1. Advanced degenerative arthritis of left hip . Procedure: Left total hip replacement    Complications: None. History of Present Illness: The patient has a long-standing history of pain within the left hip . The patient has severe degenerative arthritis of the left hip and has exhausted conservative therapy at this time including prior intra-articular injections, activity modifications, OTC NSAIDS. The patient has been limited in their ability to perform ADLs, walk short distances or climb steps appropriately. The patient presents for the above-mentioned procedure. The patient has been cleared from a medical and cardiac standpoint. Past Medical History:  Recorded in the chart. Physical Examination: Also recorded in the chart. The patient is noted for ambulating with an antalgic gait favoring the left side. Examination of the left hip reveals severe pain to rotation of the left hip . X-rays reveal  advanced degenerative arthritis of the left hip and moderate to severe arthritis of the right hip. No fractures, no sign of metastatic disease. Course in the Hospital:  The patient was admitted to the hospital.  The Pt. Underwent a left total hip replacement . Postoperatively, the pt. did well. The pt. Remained stable from a medical standpoint. The patient ambulated, 50% WBAT weightbearing within the hospital with Physical Therapy. The patient continued with this therapy at [facility] with PT. The patient began taking Coumadin pre-operatively for DVT prophylaxis and will continue on this for one month. At the time of discharge, there was no evidence of any DVT noted. The patient had negative Homans signs bilateral lower extremities.   At the time of discharge, the patient's left hip incision appeared with staples intact. No signs of infection or inflammation noted. The patient will follow up in our office in 2-1/2 weeks. DC med list:  Discharge Medication List as of 8/5/2017 12:27 PM      START taking these medications    Details   oxyCODONE IR (ROXICODONE) 5 mg immediate release tablet Take 1 Tab by mouth every four (4) hours as needed for Pain. Max Daily Amount: 30 mg., Print, Disp-60 Tab, R-0      celecoxib (CELEBREX) 200 mg capsule Take 1 Cap by mouth daily for 30 days. , Print, Disp-30 Cap, R-0      rivaroxaban (XARELTO) 10 mg tablet Take 1 Tab by mouth daily (with dinner) for 35 days. Indications: HIP SURGERY DEEP VEIN THROMBOSIS PREVENTION, Print, Disp-35 Tab, R-0         CONTINUE these medications which have NOT CHANGED    Details   bisoprolol-hydroCHLOROthiazide (ZIAC) 10-6.25 mg per tablet Take 1 Tab by mouth two (2) times a day., Historical Med      omeprazole (PRILOSEC) 20 mg capsule Take 20 mg by mouth daily. , Historical Med      rosuvastatin (CRESTOR) 10 mg tablet Take 10 mg by mouth nightly., Historical Med      loratadine (CLARITIN) 10 mg tablet Take 10 mg by mouth daily as needed for Allergies. , Historical Med      cholecalciferol (VITAMIN D3) 400 unit tab tablet Take 800 Units by mouth daily. , Historical Med      multivitamin (ONE A DAY) tablet Take 1 Tab by mouth daily. , Historical Med      FSH/FLX/PRIM/CUR/BOR/OM3,6,9 5 (OMEGA 3-6-9 FATTY ACIDS PO) Take  by mouth daily. , Historical Med      simethicone (MYLICON) 80 mg chewable tablet Take 80 mg by mouth two (2) times daily as needed for Flatulence., Historical Med         STOP taking these medications       traMADol (ULTRAM) 50 mg tablet Comments:   Reason for Stopping:               Patient Disposition: SNF  Followup Care:  Discharge Condition: good  PT/OT Eval and Treat  Regular Diet  As directed    Follow-up Information     Follow up With Details Comments [address], MD   [place] [address] Daniela Rodriguez PA-C

## 2017-08-07 NOTE — PROGRESS NOTES
Spiritual Care Partner Volunteer visited patient in [address] [facility] on 8.5. 17. Documented by:  Rev. Primus Closs Daina Kempf, M.Div, [facility]

## 2017-08-17 ENCOUNTER — NURSE NAVIGATOR (OUTPATIENT)
Dept: OTHER | Age: 77
End: 2017-08-17

## 2017-08-17 NOTE — PROGRESS NOTES
This note will not be viewable in [address] Post Discharge Follow-up contact after Joint Replacement    Patient discharged on 8/5/17  By  Carlos Enrique Finley   following  left hip Arthroplasty. Spoke with patient today, who reports they are \" doing fine since I came home from [facility]. \"  Denies Fever, Shortness of Breath or Chest Pain. Home Health has not visited; contacted [facility] to verify that RN was scheduled to visit today  Patient also reports:. Incision  clean, dry, intact; Aquacel was not removed at 7 days postop. [address] will remove today and remove staples per orders  Calf is non-tender,   operative extremity has no swelling. Pain is well managed. Discussed use of ice & elevation. Patient is exercising independently. Taking Aspirin for anticoagulation, Tylenol for pain. Patient   is not experiencing symptoms of constipation & urinating without difficulty. Discussed side effects of anticoagulants & pain medications (bleeding/bruising, constipation, lightheaded/dizziness)  Follow up appointment is scheduled for 8/22. Discussed calling surgeon Dr New Izquierdo  for drainage, bleeding, swelling in operative extremity, fever or pain. Discussed calling PCP Dr Liang Mention with other medical issues.

## 2021-11-09 ENCOUNTER — TRANSCRIBE ORDER (OUTPATIENT)
Dept: SCHEDULING | Age: 81
End: 2021-11-09

## 2021-11-09 DIAGNOSIS — M43.16 SPONDYLOLISTHESIS OF LUMBAR REGION: Primary | ICD-10-CM

## (undated) DEVICE — REM POLYHESIVE ADULT PATIENT RETURN ELECTRODE: Brand: VALLEYLAB

## (undated) DEVICE — (D)PREP SKN CHLRAPRP APPL 26ML -- CONVERT TO ITEM 371833

## (undated) DEVICE — SYR LR LCK 1ML GRAD NSAF 30ML --

## (undated) DEVICE — STERILE POLYISOPRENE POWDER-FREE SURGICAL GLOVES WITH EMOLLIENT COATING: Brand: PROTEXIS

## (undated) DEVICE — SCRUB DRY SURG EZ SCRUB BRUSH PREOPERATIVE GRN

## (undated) DEVICE — T4 HOOD

## (undated) DEVICE — SUTURE VCRL SZ 2-0 L36IN ABSRB UD L40MM CT 1/2 CIR J957H

## (undated) DEVICE — SOLUTION IRRIG 3000ML 0.9% SOD CHL FLX CONT 0797208] ICU MEDICAL INC]

## (undated) DEVICE — SOLUTION IRRIG 1000ML H2O STRL BLT

## (undated) DEVICE — 4-PORT MANIFOLD: Brand: NEPTUNE 2

## (undated) DEVICE — SUTURE VCRL SZ 1 L27IN ABSRB VLT L36MM CT-1 1/2 CIR J341H

## (undated) DEVICE — NEEDLE HYPO 22GA L1.5IN BLK S STL HUB POLYPR SHLD REG BVL

## (undated) DEVICE — BLADE ELECTRODE: Brand: EDGE

## (undated) DEVICE — TOWEL SURG W17XL27IN STD BLU COT NONFENESTRATED PREWASHED

## (undated) DEVICE — DRSG AQUACEL SURG 3.5 X 10IN -- CONVERT TO ITEM 370183

## (undated) DEVICE — Device

## (undated) DEVICE — DEVON™ KNEE AND BODY STRAP 60" X 3" (1.5 M X 7.6 CM): Brand: DEVON

## (undated) DEVICE — STERILE POLYISOPRENE POWDER-FREE SURGICAL GLOVES: Brand: PROTEXIS

## (undated) DEVICE — SUTURE STRATAFIX SYMMETRIC PDS + SZ 1 L18IN ABSRB VLT L48MM SXPP1A400

## (undated) DEVICE — HANDPIECE SET WITH BONE CLEANING TIP AND SUCTION TUBE: Brand: INTERPULSE

## (undated) DEVICE — PAD 05IN BASE 3IN PEAK M DENS CONVOLUTED FOAM

## (undated) DEVICE — DRAPE,REIN 53X77,STERILE: Brand: MEDLINE

## (undated) DEVICE — PATIENT PROTECTIVE PAD FOR IMP UNIVERSAL LATERAL HIP POSITIONER (ULP) (6/CASE): Brand: PATIENT PROTECTIVE PAD

## (undated) DEVICE — 3M™ IOBAN™ 2 ANTIMICROBIAL INCISE DRAPE 6651EZ: Brand: IOBAN™ 2

## (undated) DEVICE — GAUZE SPONGES,8 PLY: Brand: CURITY

## (undated) DEVICE — SLIM BODY SKIN STAPLER: Brand: APPOSE ULC

## (undated) DEVICE — Z CONVERTED USE 2271043 CONTAINER SPEC COLL 4OZ SCR ON LID PEEL PCH

## (undated) DEVICE — TRAY CATH 16F URIN MTR LTX -- CONVERT TO ITEM 363111

## (undated) DEVICE — COVER,MAYO STAND,STERILE: Brand: MEDLINE

## (undated) DEVICE — BLADE SAW W073XL276IN THK0031IN CUT THK0036IN REPL SAG

## (undated) DEVICE — INFECTION CONTROL KIT SYS